# Patient Record
Sex: MALE | Race: WHITE
[De-identification: names, ages, dates, MRNs, and addresses within clinical notes are randomized per-mention and may not be internally consistent; named-entity substitution may affect disease eponyms.]

---

## 2018-04-02 PROBLEM — Z00.00 ENCOUNTER FOR PREVENTIVE HEALTH EXAMINATION: Status: ACTIVE | Noted: 2018-04-02

## 2018-04-03 ENCOUNTER — APPOINTMENT (OUTPATIENT)
Dept: NEUROLOGY | Facility: CLINIC | Age: 73
End: 2018-04-03
Payer: MEDICARE

## 2018-04-03 VITALS
BODY MASS INDEX: 26.18 KG/M2 | DIASTOLIC BLOOD PRESSURE: 85 MMHG | HEIGHT: 71 IN | SYSTOLIC BLOOD PRESSURE: 167 MMHG | HEART RATE: 64 BPM | WEIGHT: 187 LBS

## 2018-04-03 DIAGNOSIS — K21.9 GASTRO-ESOPHAGEAL REFLUX DISEASE W/OUT ESOPHAGITIS: ICD-10-CM

## 2018-04-03 DIAGNOSIS — Z82.49 FAMILY HISTORY OF ISCHEMIC HEART DISEASE AND OTHER DISEASES OF THE CIRCULATORY SYSTEM: ICD-10-CM

## 2018-04-03 DIAGNOSIS — E11.9 TYPE 2 DIABETES MELLITUS W/OUT COMPLICATIONS: ICD-10-CM

## 2018-04-03 DIAGNOSIS — H91.93 UNSPECIFIED HEARING LOSS, BILATERAL: ICD-10-CM

## 2018-04-03 DIAGNOSIS — Z87.891 PERSONAL HISTORY OF NICOTINE DEPENDENCE: ICD-10-CM

## 2018-04-03 DIAGNOSIS — Z79.4 TYPE 2 DIABETES MELLITUS W/OUT COMPLICATIONS: ICD-10-CM

## 2018-04-03 DIAGNOSIS — Z83.3 FAMILY HISTORY OF DIABETES MELLITUS: ICD-10-CM

## 2018-04-03 DIAGNOSIS — K51.90 ULCERATIVE COLITIS, UNSPECIFIED, W/OUT COMPLICATIONS: ICD-10-CM

## 2018-04-03 DIAGNOSIS — Z85.01 PERSONAL HISTORY OF MALIGNANT NEOPLASM OF ESOPHAGUS: ICD-10-CM

## 2018-04-03 PROCEDURE — 96116 NUBHVL XM PHYS/QHP 1ST HR: CPT | Mod: 59

## 2018-04-03 PROCEDURE — 99205 OFFICE O/P NEW HI 60 MIN: CPT | Mod: 25

## 2018-04-03 RX ORDER — AZELASTINE HYDROCHLORIDE 137 UG/1
137 SPRAY, METERED NASAL
Qty: 30 | Refills: 0 | Status: ACTIVE | COMMUNITY
Start: 2017-08-31

## 2018-04-03 RX ORDER — INSULIN LISPRO 100 [IU]/ML
100 INJECTION, SOLUTION INTRAVENOUS; SUBCUTANEOUS
Qty: 15 | Refills: 0 | Status: ACTIVE | COMMUNITY
Start: 2017-08-04

## 2018-04-03 RX ORDER — PANTOPRAZOLE 40 MG/1
40 TABLET, DELAYED RELEASE ORAL
Qty: 60 | Refills: 0 | Status: ACTIVE | COMMUNITY
Start: 2017-09-11

## 2018-04-03 RX ORDER — PEN NEEDLE, DIABETIC 32 GX 1/4"
32G X 6 MM NEEDLE, DISPOSABLE MISCELLANEOUS
Qty: 200 | Refills: 0 | Status: ACTIVE | COMMUNITY
Start: 2018-02-16

## 2018-04-03 RX ORDER — RANITIDINE 300 MG/1
300 TABLET ORAL
Qty: 90 | Refills: 0 | Status: ACTIVE | COMMUNITY
Start: 2017-11-14

## 2018-04-03 RX ORDER — INSULIN DETEMIR 100 [IU]/ML
100 INJECTION, SOLUTION SUBCUTANEOUS
Qty: 27 | Refills: 0 | Status: ACTIVE | COMMUNITY
Start: 2017-08-04

## 2018-04-03 RX ORDER — SITAGLIPTIN AND METFORMIN HYDROCHLORIDE 100; 1000 MG/1; MG/1
100-1000 TABLET, FILM COATED, EXTENDED RELEASE ORAL
Qty: 30 | Refills: 0 | Status: ACTIVE | COMMUNITY
Start: 2017-05-05

## 2018-04-11 ENCOUNTER — APPOINTMENT (OUTPATIENT)
Dept: MRI IMAGING | Facility: CLINIC | Age: 73
End: 2018-04-11
Payer: MEDICARE

## 2018-04-11 ENCOUNTER — OUTPATIENT (OUTPATIENT)
Dept: OUTPATIENT SERVICES | Facility: HOSPITAL | Age: 73
LOS: 1 days | End: 2018-04-11
Payer: MEDICARE

## 2018-04-11 DIAGNOSIS — Z00.8 ENCOUNTER FOR OTHER GENERAL EXAMINATION: ICD-10-CM

## 2018-04-11 PROCEDURE — 82565 ASSAY OF CREATININE: CPT

## 2018-04-11 PROCEDURE — 70547 MR ANGIOGRAPHY NECK W/O DYE: CPT | Mod: 26

## 2018-04-11 PROCEDURE — 70544 MR ANGIOGRAPHY HEAD W/O DYE: CPT

## 2018-04-11 PROCEDURE — 70553 MRI BRAIN STEM W/O & W/DYE: CPT

## 2018-04-11 PROCEDURE — A9585: CPT

## 2018-04-11 PROCEDURE — 70553 MRI BRAIN STEM W/O & W/DYE: CPT | Mod: 26

## 2018-04-11 PROCEDURE — 70547 MR ANGIOGRAPHY NECK W/O DYE: CPT

## 2018-04-30 ENCOUNTER — APPOINTMENT (OUTPATIENT)
Dept: NEUROLOGY | Facility: CLINIC | Age: 73
End: 2018-04-30
Payer: MEDICARE

## 2018-04-30 PROCEDURE — 95819 EEG AWAKE AND ASLEEP: CPT

## 2018-05-16 ENCOUNTER — APPOINTMENT (OUTPATIENT)
Dept: NEUROLOGY | Facility: CLINIC | Age: 73
End: 2018-05-16
Payer: MEDICARE

## 2018-05-16 VITALS
HEIGHT: 71 IN | HEART RATE: 62 BPM | SYSTOLIC BLOOD PRESSURE: 163 MMHG | BODY MASS INDEX: 26.88 KG/M2 | WEIGHT: 192 LBS | DIASTOLIC BLOOD PRESSURE: 78 MMHG

## 2018-05-16 DIAGNOSIS — R03.0 ELEVATED BLOOD-PRESSURE READING, W/OUT DIAGNOSIS OF HYPERTENSION: ICD-10-CM

## 2018-05-16 DIAGNOSIS — E53.8 DEFICIENCY OF OTHER SPECIFIED B GROUP VITAMINS: ICD-10-CM

## 2018-05-16 DIAGNOSIS — E55.9 VITAMIN D DEFICIENCY, UNSPECIFIED: ICD-10-CM

## 2018-05-16 DIAGNOSIS — R41.89 OTHER SYMPTOMS AND SIGNS INVOLVING COGNITIVE FUNCTIONS AND AWARENESS: ICD-10-CM

## 2018-05-16 PROCEDURE — 99215 OFFICE O/P EST HI 40 MIN: CPT

## 2018-05-16 RX ORDER — ROSUVASTATIN CALCIUM 5 MG/1
5 TABLET, FILM COATED ORAL DAILY
Refills: 0 | Status: ACTIVE | COMMUNITY
Start: 2018-01-30

## 2018-05-16 RX ORDER — ASPIRIN 81 MG
81 TABLET, DELAYED RELEASE (ENTERIC COATED) ORAL
Refills: 0 | Status: ACTIVE | COMMUNITY

## 2018-05-16 RX ORDER — MESALAMINE 1.2 G/1
1.2 TABLET, DELAYED RELEASE ORAL
Qty: 120 | Refills: 0 | Status: ACTIVE | COMMUNITY
Start: 2017-06-20

## 2018-05-16 RX ORDER — CROMOLYN SODIUM 5.2 MG
AEROSOL, SPRAY WITH PUMP (ML) NASAL
Refills: 0 | Status: ACTIVE | COMMUNITY

## 2018-06-11 ENCOUNTER — APPOINTMENT (OUTPATIENT)
Dept: NEUROLOGY | Facility: CLINIC | Age: 73
End: 2018-06-11
Payer: MEDICARE

## 2018-06-11 VITALS
HEART RATE: 69 BPM | WEIGHT: 195 LBS | HEIGHT: 71 IN | DIASTOLIC BLOOD PRESSURE: 68 MMHG | SYSTOLIC BLOOD PRESSURE: 151 MMHG | BODY MASS INDEX: 27.3 KG/M2

## 2018-06-11 DIAGNOSIS — I67.9 CEREBROVASCULAR DISEASE, UNSPECIFIED: ICD-10-CM

## 2018-06-11 DIAGNOSIS — E78.5 HYPERLIPIDEMIA, UNSPECIFIED: ICD-10-CM

## 2018-06-11 PROCEDURE — 99215 OFFICE O/P EST HI 40 MIN: CPT

## 2018-07-31 ENCOUNTER — APPOINTMENT (OUTPATIENT)
Dept: NEUROLOGY | Facility: CLINIC | Age: 73
End: 2018-07-31

## 2018-09-27 ENCOUNTER — APPOINTMENT (OUTPATIENT)
Dept: NEUROLOGY | Facility: CLINIC | Age: 73
End: 2018-09-27

## 2024-11-28 ENCOUNTER — INPATIENT (INPATIENT)
Facility: HOSPITAL | Age: 79
LOS: 5 days | Discharge: NURSING, REHAB OR LONG-TERM CARE | DRG: 871 | End: 2024-12-04
Attending: STUDENT IN AN ORGANIZED HEALTH CARE EDUCATION/TRAINING PROGRAM | Admitting: INTERNAL MEDICINE
Payer: MEDICARE

## 2024-11-28 VITALS
HEART RATE: 163 BPM | DIASTOLIC BLOOD PRESSURE: 81 MMHG | OXYGEN SATURATION: 100 % | SYSTOLIC BLOOD PRESSURE: 107 MMHG | TEMPERATURE: 98 F | RESPIRATION RATE: 16 BRPM

## 2024-11-28 DIAGNOSIS — I48.91 UNSPECIFIED ATRIAL FIBRILLATION: ICD-10-CM

## 2024-11-28 DIAGNOSIS — F32.89 OTHER SPECIFIED DEPRESSIVE EPISODES: ICD-10-CM

## 2024-11-28 DIAGNOSIS — I10 ESSENTIAL (PRIMARY) HYPERTENSION: ICD-10-CM

## 2024-11-28 DIAGNOSIS — Z87.81 PERSONAL HISTORY OF (HEALED) TRAUMATIC FRACTURE: Chronic | ICD-10-CM

## 2024-11-28 DIAGNOSIS — E11.9 TYPE 2 DIABETES MELLITUS WITHOUT COMPLICATIONS: ICD-10-CM

## 2024-11-28 DIAGNOSIS — K86.9 DISEASE OF PANCREAS, UNSPECIFIED: ICD-10-CM

## 2024-11-28 DIAGNOSIS — N17.9 ACUTE KIDNEY FAILURE, UNSPECIFIED: ICD-10-CM

## 2024-11-28 DIAGNOSIS — E87.29 OTHER ACIDOSIS: ICD-10-CM

## 2024-11-28 DIAGNOSIS — K57.10 DIVERTICULOSIS OF SMALL INTESTINE WITHOUT PERFORATION OR ABSCESS WITHOUT BLEEDING: ICD-10-CM

## 2024-11-28 DIAGNOSIS — D64.9 ANEMIA, UNSPECIFIED: ICD-10-CM

## 2024-11-28 DIAGNOSIS — K44.9 DIAPHRAGMATIC HERNIA WITHOUT OBSTRUCTION OR GANGRENE: ICD-10-CM

## 2024-11-28 DIAGNOSIS — Z29.9 ENCOUNTER FOR PROPHYLACTIC MEASURES, UNSPECIFIED: ICD-10-CM

## 2024-11-28 DIAGNOSIS — N17.0 ACUTE KIDNEY FAILURE WITH TUBULAR NECROSIS: ICD-10-CM

## 2024-11-28 DIAGNOSIS — G93.41 METABOLIC ENCEPHALOPATHY: ICD-10-CM

## 2024-11-28 DIAGNOSIS — A41.9 SEPSIS, UNSPECIFIED ORGANISM: ICD-10-CM

## 2024-11-28 DIAGNOSIS — K50.90 CROHN'S DISEASE, UNSPECIFIED, WITHOUT COMPLICATIONS: ICD-10-CM

## 2024-11-28 LAB
ADD ON TEST-SPECIMEN IN LAB: SIGNIFICANT CHANGE UP
ALBUMIN SERPL ELPH-MCNC: 3.5 G/DL — SIGNIFICANT CHANGE UP (ref 3.3–5)
ALP SERPL-CCNC: 176 U/L — HIGH (ref 40–120)
ALT FLD-CCNC: <5 U/L — LOW (ref 10–45)
ANION GAP SERPL CALC-SCNC: 17 MMOL/L — SIGNIFICANT CHANGE UP (ref 5–17)
APPEARANCE UR: CLEAR — SIGNIFICANT CHANGE UP
APTT BLD: 29.6 SEC — SIGNIFICANT CHANGE UP (ref 24.5–35.6)
AST SERPL-CCNC: 9 U/L — LOW (ref 10–40)
BASOPHILS # BLD AUTO: 0.02 K/UL — SIGNIFICANT CHANGE UP (ref 0–0.2)
BASOPHILS NFR BLD AUTO: 0.3 % — SIGNIFICANT CHANGE UP (ref 0–2)
BILIRUB SERPL-MCNC: 0.3 MG/DL — SIGNIFICANT CHANGE UP (ref 0.2–1.2)
BILIRUB UR-MCNC: NEGATIVE — SIGNIFICANT CHANGE UP
BUN SERPL-MCNC: 56 MG/DL — HIGH (ref 7–23)
CALCIUM SERPL-MCNC: 9.2 MG/DL — SIGNIFICANT CHANGE UP (ref 8.4–10.5)
CHLORIDE SERPL-SCNC: 102 MMOL/L — SIGNIFICANT CHANGE UP (ref 96–108)
CO2 SERPL-SCNC: 20 MMOL/L — LOW (ref 22–31)
COLOR SPEC: SIGNIFICANT CHANGE UP
CREAT SERPL-MCNC: 0.99 MG/DL — SIGNIFICANT CHANGE UP (ref 0.5–1.3)
DIFF PNL FLD: NEGATIVE — SIGNIFICANT CHANGE UP
EGFR: 78 ML/MIN/1.73M2 — SIGNIFICANT CHANGE UP
EOSINOPHIL # BLD AUTO: 0.06 K/UL — SIGNIFICANT CHANGE UP (ref 0–0.5)
EOSINOPHIL NFR BLD AUTO: 0.8 % — SIGNIFICANT CHANGE UP (ref 0–6)
FLUAV AG NPH QL: SIGNIFICANT CHANGE UP
FLUBV AG NPH QL: SIGNIFICANT CHANGE UP
GAS PNL BLDV: SIGNIFICANT CHANGE UP
GLUCOSE SERPL-MCNC: 247 MG/DL — HIGH (ref 70–99)
GLUCOSE UR QL: 500 MG/DL
HCT VFR BLD CALC: 33 % — LOW (ref 39–50)
HGB BLD-MCNC: 10.3 G/DL — LOW (ref 13–17)
IMM GRANULOCYTES NFR BLD AUTO: 0.5 % — SIGNIFICANT CHANGE UP (ref 0–0.9)
INR BLD: 1.15 — SIGNIFICANT CHANGE UP (ref 0.85–1.16)
KETONES UR-MCNC: ABNORMAL MG/DL
LACTATE SERPL-SCNC: 1.3 MMOL/L — SIGNIFICANT CHANGE UP (ref 0.5–2)
LACTATE SERPL-SCNC: 2.2 MMOL/L — HIGH (ref 0.5–2)
LEUKOCYTE ESTERASE UR-ACNC: NEGATIVE — SIGNIFICANT CHANGE UP
LYMPHOCYTES # BLD AUTO: 0.62 K/UL — LOW (ref 1–3.3)
LYMPHOCYTES # BLD AUTO: 8.4 % — LOW (ref 13–44)
MCHC RBC-ENTMCNC: 28.5 PG — SIGNIFICANT CHANGE UP (ref 27–34)
MCHC RBC-ENTMCNC: 31.2 G/DL — LOW (ref 32–36)
MCV RBC AUTO: 91.2 FL — SIGNIFICANT CHANGE UP (ref 80–100)
MONOCYTES # BLD AUTO: 0.25 K/UL — SIGNIFICANT CHANGE UP (ref 0–0.9)
MONOCYTES NFR BLD AUTO: 3.4 % — SIGNIFICANT CHANGE UP (ref 2–14)
NEUTROPHILS # BLD AUTO: 6.43 K/UL — SIGNIFICANT CHANGE UP (ref 1.8–7.4)
NEUTROPHILS NFR BLD AUTO: 86.6 % — HIGH (ref 43–77)
NITRITE UR-MCNC: NEGATIVE — SIGNIFICANT CHANGE UP
NRBC # BLD: 0 /100 WBCS — SIGNIFICANT CHANGE UP (ref 0–0)
PH UR: 5 — SIGNIFICANT CHANGE UP (ref 5–8)
PLATELET # BLD AUTO: 288 K/UL — SIGNIFICANT CHANGE UP (ref 150–400)
POTASSIUM SERPL-MCNC: 4.7 MMOL/L — SIGNIFICANT CHANGE UP (ref 3.5–5.3)
POTASSIUM SERPL-SCNC: 4.7 MMOL/L — SIGNIFICANT CHANGE UP (ref 3.5–5.3)
PROT SERPL-MCNC: 7.5 G/DL — SIGNIFICANT CHANGE UP (ref 6–8.3)
PROT UR-MCNC: 30 MG/DL
PROTHROM AB SERPL-ACNC: 13.4 SEC — SIGNIFICANT CHANGE UP (ref 9.9–13.4)
RBC # BLD: 3.62 M/UL — LOW (ref 4.2–5.8)
RBC # FLD: 14.2 % — SIGNIFICANT CHANGE UP (ref 10.3–14.5)
RSV RNA NPH QL NAA+NON-PROBE: SIGNIFICANT CHANGE UP
SARS-COV-2 RNA SPEC QL NAA+PROBE: SIGNIFICANT CHANGE UP
SODIUM SERPL-SCNC: 139 MMOL/L — SIGNIFICANT CHANGE UP (ref 135–145)
SP GR SPEC: 1.02 — SIGNIFICANT CHANGE UP (ref 1–1.03)
TROPONIN T, HIGH SENSITIVITY RESULT: 103 NG/L — CRITICAL HIGH (ref 0–51)
TROPONIN T, HIGH SENSITIVITY RESULT: 91 NG/L — CRITICAL HIGH (ref 0–51)
UROBILINOGEN FLD QL: 1 MG/DL — SIGNIFICANT CHANGE UP (ref 0.2–1)
WBC # BLD: 7.42 K/UL — SIGNIFICANT CHANGE UP (ref 3.8–10.5)
WBC # FLD AUTO: 7.42 K/UL — SIGNIFICANT CHANGE UP (ref 3.8–10.5)

## 2024-11-28 PROCEDURE — 99223 1ST HOSP IP/OBS HIGH 75: CPT

## 2024-11-28 PROCEDURE — 70450 CT HEAD/BRAIN W/O DYE: CPT | Mod: 26,MC

## 2024-11-28 PROCEDURE — 74177 CT ABD & PELVIS W/CONTRAST: CPT | Mod: 26,MC

## 2024-11-28 PROCEDURE — 93010 ELECTROCARDIOGRAM REPORT: CPT

## 2024-11-28 PROCEDURE — 71045 X-RAY EXAM CHEST 1 VIEW: CPT | Mod: 26

## 2024-11-28 PROCEDURE — 99285 EMERGENCY DEPT VISIT HI MDM: CPT

## 2024-11-28 RX ORDER — ACETAMINOPHEN, DIPHENHYDRAMINE HCL, PHENYLEPHRINE HCL 325; 25; 5 MG/1; MG/1; MG/1
3 TABLET ORAL AT BEDTIME
Refills: 0 | Status: DISCONTINUED | OUTPATIENT
Start: 2024-11-28 | End: 2024-12-04

## 2024-11-28 RX ORDER — PANTOPRAZOLE SODIUM 40 MG/1
40 TABLET, DELAYED RELEASE ORAL
Refills: 0 | Status: DISCONTINUED | OUTPATIENT
Start: 2024-11-28 | End: 2024-12-04

## 2024-11-28 RX ORDER — SODIUM CHLORIDE 9 MG/ML
1000 INJECTION, SOLUTION INTRAMUSCULAR; INTRAVENOUS; SUBCUTANEOUS ONCE
Refills: 0 | Status: COMPLETED | OUTPATIENT
Start: 2024-11-28 | End: 2024-11-28

## 2024-11-28 RX ORDER — ACETAMINOPHEN 500MG 500 MG/1
1000 TABLET, COATED ORAL ONCE
Refills: 0 | Status: COMPLETED | OUTPATIENT
Start: 2024-11-28 | End: 2024-11-28

## 2024-11-28 RX ORDER — METOPROLOL TARTRATE 100 MG/1
5 TABLET, FILM COATED ORAL ONCE
Refills: 0 | Status: DISCONTINUED | OUTPATIENT
Start: 2024-11-28 | End: 2024-11-28

## 2024-11-28 RX ORDER — VANCOMYCIN HCL 900 MCG/MG
1250 POWDER (GRAM) MISCELLANEOUS EVERY 12 HOURS
Refills: 0 | Status: DISCONTINUED | OUTPATIENT
Start: 2024-11-29 | End: 2024-11-29

## 2024-11-28 RX ORDER — GLUCAGON INJECTION, SOLUTION 0.5 MG/.1ML
1 INJECTION, SOLUTION SUBCUTANEOUS ONCE
Refills: 0 | Status: DISCONTINUED | OUTPATIENT
Start: 2024-11-28 | End: 2024-12-04

## 2024-11-28 RX ORDER — MIRTAZAPINE 15 MG/1
15 TABLET, FILM COATED ORAL AT BEDTIME
Refills: 0 | Status: DISCONTINUED | OUTPATIENT
Start: 2024-11-28 | End: 2024-11-28

## 2024-11-28 RX ORDER — CHOLECALCIFEROL (VITAMIN D3) 10MCG/0.25
1000 DROPS ORAL DAILY
Refills: 0 | Status: DISCONTINUED | OUTPATIENT
Start: 2024-11-28 | End: 2024-12-04

## 2024-11-28 RX ORDER — ONDANSETRON HYDROCHLORIDE 4 MG/1
4 TABLET, FILM COATED ORAL EVERY 8 HOURS
Refills: 0 | Status: DISCONTINUED | OUTPATIENT
Start: 2024-11-28 | End: 2024-12-04

## 2024-11-28 RX ORDER — PIPERACILLIN SODIUM AND TAZOBACTAM SODIUM 4; .5 G/20ML; G/20ML
3.38 INJECTION, POWDER, LYOPHILIZED, FOR SOLUTION INTRAVENOUS ONCE
Refills: 0 | Status: COMPLETED | OUTPATIENT
Start: 2024-11-28 | End: 2024-11-28

## 2024-11-28 RX ORDER — RIVAROXABAN 10 MG/1
20 TABLET, FILM COATED ORAL
Refills: 0 | Status: DISCONTINUED | OUTPATIENT
Start: 2024-11-28 | End: 2024-12-04

## 2024-11-28 RX ORDER — PIPERACILLIN SODIUM AND TAZOBACTAM SODIUM 4; .5 G/20ML; G/20ML
4.5 INJECTION, POWDER, LYOPHILIZED, FOR SOLUTION INTRAVENOUS EVERY 8 HOURS
Refills: 0 | Status: DISCONTINUED | OUTPATIENT
Start: 2024-11-28 | End: 2024-11-29

## 2024-11-28 RX ORDER — VANCOMYCIN HCL 900 MCG/MG
1000 POWDER (GRAM) MISCELLANEOUS ONCE
Refills: 0 | Status: COMPLETED | OUTPATIENT
Start: 2024-11-28 | End: 2024-11-28

## 2024-11-28 RX ORDER — ACETAMINOPHEN 500MG 500 MG/1
650 TABLET, COATED ORAL EVERY 6 HOURS
Refills: 0 | Status: DISCONTINUED | OUTPATIENT
Start: 2024-11-28 | End: 2024-12-04

## 2024-11-28 RX ORDER — 0.9 % SODIUM CHLORIDE 0.9 %
1000 INTRAVENOUS SOLUTION INTRAVENOUS
Refills: 0 | Status: DISCONTINUED | OUTPATIENT
Start: 2024-11-28 | End: 2024-11-29

## 2024-11-28 RX ORDER — MAGNESIUM, ALUMINUM HYDROXIDE 200-225/5
30 SUSPENSION, ORAL (FINAL DOSE FORM) ORAL EVERY 4 HOURS
Refills: 0 | Status: DISCONTINUED | OUTPATIENT
Start: 2024-11-28 | End: 2024-12-04

## 2024-11-28 RX ORDER — METOPROLOL TARTRATE 100 MG/1
25 TABLET, FILM COATED ORAL EVERY 24 HOURS
Refills: 0 | Status: DISCONTINUED | OUTPATIENT
Start: 2024-11-28 | End: 2024-12-04

## 2024-11-28 RX ORDER — 0.9 % SODIUM CHLORIDE 0.9 %
1000 INTRAVENOUS SOLUTION INTRAVENOUS
Refills: 0 | Status: DISCONTINUED | OUTPATIENT
Start: 2024-11-28 | End: 2024-12-04

## 2024-11-28 RX ADMIN — Medication 75 MILLILITER(S): at 21:39

## 2024-11-28 RX ADMIN — SODIUM CHLORIDE 1000 MILLILITER(S): 9 INJECTION, SOLUTION INTRAMUSCULAR; INTRAVENOUS; SUBCUTANEOUS at 12:39

## 2024-11-28 RX ADMIN — Medication 250 MILLIGRAM(S): at 13:11

## 2024-11-28 RX ADMIN — Medication 1000 MILLIGRAM(S): at 14:29

## 2024-11-28 RX ADMIN — PIPERACILLIN SODIUM AND TAZOBACTAM SODIUM 3.38 GRAM(S): 4; .5 INJECTION, POWDER, LYOPHILIZED, FOR SOLUTION INTRAVENOUS at 13:30

## 2024-11-28 RX ADMIN — METOPROLOL TARTRATE 25 MILLIGRAM(S): 100 TABLET, FILM COATED ORAL at 19:52

## 2024-11-28 RX ADMIN — SODIUM CHLORIDE 1000 MILLILITER(S): 9 INJECTION, SOLUTION INTRAMUSCULAR; INTRAVENOUS; SUBCUTANEOUS at 14:29

## 2024-11-28 RX ADMIN — RIVAROXABAN 20 MILLIGRAM(S): 10 TABLET, FILM COATED ORAL at 22:42

## 2024-11-28 RX ADMIN — PIPERACILLIN SODIUM AND TAZOBACTAM SODIUM 200 GRAM(S): 4; .5 INJECTION, POWDER, LYOPHILIZED, FOR SOLUTION INTRAVENOUS at 12:42

## 2024-11-28 RX ADMIN — ACETAMINOPHEN 500MG 1000 MILLIGRAM(S): 500 TABLET, COATED ORAL at 13:30

## 2024-11-28 RX ADMIN — SODIUM CHLORIDE 1000 MILLILITER(S): 9 INJECTION, SOLUTION INTRAMUSCULAR; INTRAVENOUS; SUBCUTANEOUS at 12:58

## 2024-11-28 RX ADMIN — PIPERACILLIN SODIUM AND TAZOBACTAM SODIUM 25 GRAM(S): 4; .5 INJECTION, POWDER, LYOPHILIZED, FOR SOLUTION INTRAVENOUS at 22:21

## 2024-11-28 RX ADMIN — SODIUM CHLORIDE 1000 MILLILITER(S): 9 INJECTION, SOLUTION INTRAMUSCULAR; INTRAVENOUS; SUBCUTANEOUS at 13:30

## 2024-11-28 RX ADMIN — ACETAMINOPHEN 500MG 400 MILLIGRAM(S): 500 TABLET, COATED ORAL at 12:40

## 2024-11-28 RX ADMIN — Medication 1000 UNIT(S): at 19:52

## 2024-11-28 NOTE — H&P ADULT - NSHPPHYSICALEXAM_GEN_ALL_CORE
.  VITAL SIGNS:  T(C): 36.3 (11-28-24 @ 16:02), Max: 38 (11-28-24 @ 11:45)  T(F): 97.4 (11-28-24 @ 16:02), Max: 100.4 (11-28-24 @ 11:45)  HR: 84 (11-28-24 @ 16:02) (76 - 166)  BP: 126/64 (11-28-24 @ 16:02) (91/65 - 126/64)  BP(mean): --  RR: 16 (11-28-24 @ 16:02) (16 - 20)  SpO2: 100% (11-28-24 @ 16:02) (95% - 100%)  Wt(kg): --    PHYSICAL EXAM:    Constitutional: Resting comfortably in bed; NAD  Head: NC/AT  Eyes: PERRL, EOMI, clear conjunctiva  ENT: no nasal discharge; uvula midline, no oropharyngeal erythema or exudates; MMM  Neck: supple; no JVD or thyromegaly  Respiratory: CTA B/L; no W/R/R, no retractions  Cardiac: +S1/S2; RRR; no M/R/G;  Gastrointestinal: soft, NT/ND; no rebound or guarding; +BSx4  Extremities: WWP, no clubbing or cyanosis; no peripheral edema  Musculoskeletal: NROM x4; no joint swelling, tenderness or erythema  Vascular: 2+ radial, femoral, DP/PT pulses B/L  Dermatologic: skin warm, dry and intact; no rashes, wounds, or scars  Neurologic: AAOx3; CNII-XII grossly intact; no focal deficits  Psychiatric: affect and characteristics of appearance, verbalizations, behaviors are appropriate .  VITAL SIGNS:  T(C): 36.3 (11-28-24 @ 16:02), Max: 38 (11-28-24 @ 11:45)  T(F): 97.4 (11-28-24 @ 16:02), Max: 100.4 (11-28-24 @ 11:45)  HR: 84 (11-28-24 @ 16:02) (76 - 166)  BP: 126/64 (11-28-24 @ 16:02) (91/65 - 126/64)  BP(mean): --  RR: 16 (11-28-24 @ 16:02) (16 - 20)  SpO2: 100% (11-28-24 @ 16:02) (95% - 100%)  Wt(kg): --    PHYSICAL EXAM:    Constitutional: Resting comfortably in bed; NAD, lethargic but opens eyes to sternal rub, able to cough, move upper extremities   Head: NC/AT  Eyes: PERRL, clear conjunctiva  ENT: no nasal discharge;  MM are dry  Neck: supple;  Respiratory: Decreased breath sound in L base; normal respiratory effort   Cardiac: +S1/S2; Irregularly irregular, systolic murmur louder in R parasternal border   Gastrointestinal: soft, diffusely tender to palpation   Extremities: WWP, no clubbing or cyanosis; no peripheral edema  Musculoskeletal: no joint swelling, tenderness or erythema  Dermatologic: Sacral ulcer with necrotic tissue, purulent discharge and surrounding erythema, Otherwise skin warm, dry and intact; no rashes, wounds, or scars  Neurologic: Lethargic

## 2024-11-28 NOTE — H&P ADULT - PROBLEM SELECTOR PLAN 9
Home meds: Losartan 50 qd    Plan:   -hold as normotensive, restart if needed Bicarb 20, AG 17 likely i/s/o lactic acidosis     Plan:   monitor BMP

## 2024-11-28 NOTE — H&P ADULT - PROBLEM SELECTOR PLAN 10
Home meds: Alogliptin 25 qd, Metformin 1000 BID, Insulin Glargine 5U qhs    Plan:   -check a1c  -iss Home meds: Losartan 50 qd    Plan:   -hold as normotensive, restart if needed

## 2024-11-28 NOTE — H&P ADULT - ATTENDING COMMENTS
80 yo M ([facility]) with PMHx AFib (Xarelto), esophageal cancer (s/p partial esophagectomy), ulcerative colitis, DM (c/b peripheral neuropathy), HTN, HLD, asthma, depression/anxiety BIBEMS from NH with 1-2d hx of altered mental status and increased lethargy, found to have imaging concerning for LLL lung consolidation, admitted for further evaluation and management of metabolic encephalopathy 2/2 community-acquired PNA vs infected sacral ulcer.     Meeting 2/4 SIRS criteria (HR >90, T 100.4F). Initial VS concerning for -160s. EKG reviewed. HR improved s/p 2L IVF. Satting well on RA at rest. Troponin T 103, repeat 91. Likely demand i/s/o rapid AFlutter. Remainder of labs reviewed. No significant leukocytosis or bandemia. Lactate 2.2, repeat 1.3. Pro-madai 0.22. Hb 10.3 (MCV 91.2). No prior labs to compare. Bicarb 20. BUN 56. AlkPhos 176. Remainder of CBC/CMP largely within normal limits. UA with <5 WBC and negative bacteria however +hyaline/granular casts. COVID/Flu/RSV PCR negative. CTH without acute pathology. CXR clear without consolidations or effusions however CT reading findings concerning for LLL PNA. Additional findings include pancreatic head abnormality, duodenal diverticulum vs contained perf, moderate amount steatorrhea, and sacral decubitus ulcer without evidence of OM. Surgery consulted in ED given findings of possible contained perforation, no acute surgical interventions recommended at this time.      [ ] Repeat EKG   [ ] TTE   [ ] Continue home Toprol/Xarelto (AFib)   [ ] Hold home Losartan      [ ] Vanc + Zosyn   [ ] MRSA PCR, Urine Legionella/Strep, Sputum cx   [ ] Blood cx x2 (Sent from ED)   [ ] Full RVP     [ ] Surgery consulted in ED (Duodenal diverticulum vs contained perforation)   - No acute surgical interventions   [ ] Bowel regimen     [ ] Wound care consult (Sacral decubitus ulcer)   [ ] Aspiration precautions   [ ] Fall precautions   [ ] PT evaluation

## 2024-11-28 NOTE — H&P ADULT - PROBLEM SELECTOR PLAN 12
DVT ppx; DVT ppx; xarelto  Diet: NPO pend dysphagia screen   Replete: Mg<2, Ph<3, K<4  Code status: DNR/DNI  Dispo: [facility] Home med: Mirtazapine 15 qhs    Plan:   -Hold mirtazapine given concern for sedation

## 2024-11-28 NOTE — H&P ADULT - PROBLEM SELECTOR PLAN 5
#anemia   On admission:   Hgb 10.3, MCV 91.2    Plan:   -f/u irion studies. ferritin, B12, folate, reticuloctye count On admission: Hgb 10.3, MCV 91.2. No active signs of bleeding. On home iron supplements   As per the daughter had colonoscopy recently without concerning findings     Plan:   -f/u Iron studies. ferritin, B12, folate, reticulocyte count  -Hold iron supplement given sepsis CT showed: A portion of the pancreatic tail herniating into the intrathoracic  space via esophageal hiatus as well as intrathoracic stomach.   Home meds:   Omeprazole 40 qd    Plan:   -c/w pantoprazole 40 (interchange)   -surgery recs regarding plans for intervention

## 2024-11-28 NOTE — PATIENT PROFILE ADULT - FUNCTIONAL ASSESSMENT - BASIC MOBILITY 6.
1-calculated by average/Not able to assess (calculate score using [facility] averaging method)

## 2024-11-28 NOTE — H&P ADULT - PROBLEM SELECTOR PLAN 2
EKG: , QTc 398, regular narrow complex tachycardia, likely SVT  Troponin downtrended    Plan:   Repeat EKG On presentation HR 160s. EKG: , QTc 398, regular narrow complex tachycardia, likely SVT. Troponin downtrended Troponin 103->91. On admission VS stable s/p fluids HR 99, /76   Home meds: Metoprolol succinate 25 qd and Xarelto 20 qd    Plan:   -Repeat EKG  -c/w Metoprolol succinate 25 qd and Xarelto 20 qd On presentation HR 160s. EKG: , QTc 398, regular narrow complex tachycardia, likely SVT. Troponin downtrended Troponin 103->91. On admission VS stable s/p fluids HR 99, /76   Loud systolic murmur on R Sternal Border   Home meds: Metoprolol succinate 25 qd and Xarelto 20 qd    Plan:   -Repeat EKG  -c/w Metoprolol succinate 25 qd and Xarelto 20 qd  -obtain TTE Lethargic but arousable. Protecting airway. Baseline AOx4. Likely i/s/o severe sepsis 2/2 PNA vs sacral ulcer vs other.    Plan:  -Management as above

## 2024-11-28 NOTE — ED ADULT NURSE REASSESSMENT NOTE - NS ED NURSE REASSESS COMMENT FT1
Patient baseline mental status, lethargic, no c/o of pain, no SOB, not in any distress.  Cardiac monitoring ongoing, BP and HR improved, afebrile, other vitals stable.  NSS bolus ongoing, Zosyn IVPB completed, VAncomycin IVPB ongoing, no adverse rxn.  CT scan pending.  In and out straight cath done aseptically; UA UC sent to lab.  Repositioned for comfort.  Fall precaution observed.  STable and comfortable.

## 2024-11-28 NOTE — H&P ADULT - ASSESSMENT
79 M hx of afib on xarelto, htn, esophageal neoplasm, crohns, T2DM, sacral ulcer, here for lethargy/AMS and hypotension in NH noticed this morning found to have sepsis, likely i/s/o pneumonia vs sacral ulcer.

## 2024-11-28 NOTE — ED ADULT NURSE NOTE - CHPI ED NUR SYMPTOMS NEG
no chest pain/no chills/no congestion/no diaphoresis/no dizziness/no nausea/no shortness of breath/no syncope/no vomiting

## 2024-11-28 NOTE — ED ADULT NURSE REASSESSMENT NOTE - NS ED NURSE REASSESS COMMENT FT1
Patient sleepy, appears confused, not answering questions appropriately, no pain complaint, no SOB.  Afib w/ RVR on cardiac monitor, other vitals stable.  Right FA PIV #20 in place from EMS, patent, all labs, blood cultures sent, no complications.  Large pressure ulcer unstageable w/ black and green eschar noted on sacrum.  NSS 2 L bolus ongoing, Zosyn IVPB , Tylenol 1 gm IVPB ongoing, no adverse rxn.  Xray and CT scan pending.  NPO observed.  Fall precaution observed.

## 2024-11-28 NOTE — H&P ADULT - CONVERSATION DETAILS
I spoke with Ms Guerita Davenport who was identified as health care proxy and we had an extensive conversation about Mr. Rivas care and current condition.     Discussed overall goals of care including advanced directives with her. During this dicussion we reviewed the current diagnosis, treatment plan, and likely prognosis. An explanination of advanced directives and [facility] was provided.She identifies as one of the most important goals to avoid invasive measures.  After this conversation decision was made  to change status to DNR/Trial of NIV. MOLST form completed, signed, and placed in chart.

## 2024-11-28 NOTE — ED ADULT TRIAGE NOTE - CHIEF COMPLAINT QUOTE
Per EMS, pt altered since last night, lethargic, hypontensive at nursing home, tachycardic during assessment.

## 2024-11-28 NOTE — H&P ADULT - PROBLEM SELECTOR PLAN 8
#HAGMA  Bicarb 20, AG 17 likely i/s/o lactic acidosis     Plan:   monitor BMP Bicarb 20, AG 17 likely i/s/o lactic acidosis     Plan:   monitor BMP CT showed: Ill-defined hypoattenuation of the pancreatic head.     Plan:   Consider MRI abdomen as outpatient

## 2024-11-28 NOTE — H&P ADULT - PROBLEM SELECTOR PLAN 11
Home med: Mirtazapine 15 qhs    Plan:   -Hold mirtazapine given concern for sedation Home meds: Alogliptin 25 qd, Metformin 1000 BID, Insulin Glargine 5U qhs    Plan:   -check a1c  -iss

## 2024-11-28 NOTE — CONSULT NOTE ADULT - ASSESSMENT
78yo Male pt with PMH of Afib (on Xarelto), HTN, T2DM, Chron's Disease, dementia, depression, esophageal carcinoma (remission since early 2000s), significant hearing loss, and femur fracture (10/2024); and PSH of distal esopahgectomy?. Patient was brought to ED from [facility] for lethargy, altered mental status, tachycardic and hypotension during the morning. General surgery consulted for imaging findings of pelvic collection related to sacral decub ulcer and possible contained duodenal diverticulum perf.  On exam, abdomen soft, NT, ND, no guarding, no rigidity with upper midline incision, well healed. Sacrum with sacral decubitus ulcer 5x6cm with minimal necrotic tissue, inferior eschar, and surrounding erythema; with no palpable fluctuance. CTAP showing In the medial aspect of the second portion of the duodenum, there are few air pockets, which may represent air within the duodenal diverticulum or contained perforation. Skin thickening and subcutaneous fat stranding/small collection in the sacrum, consistent with sacral decubitus ulcer. Patient admitted to medicine service for sepsis, and found to have Left Lower Lobe pneumonia and UTI. Surgery consulted for possible duodenal diverticulum contained perforation and sacral decubitus ulcer with possible collection, but given benign abdominal exam, no WBC, very low suspicion for contained duodenal perforation and most likely air within diverticulum. And  Sacral decubitus ulcer without underlying fluctuance to suggest abscess. Sepsis etiology more likely from pneumonia or UTI.     Recommendations:    No acute surgical intervention at this time  Agree with IV ATBx  Wound care consult for local wound care  Frequen turning  Wet to dry dressing changes      Team 4c will follow

## 2024-11-28 NOTE — PATIENT PROFILE ADULT - FALL HARM RISK - HARM RISK INTERVENTIONS

## 2024-11-28 NOTE — H&P ADULT - PROBLEM SELECTOR PLAN 6
On admission: BUN 56, creatinine 0.99 with granular cast, theresa i/s/o sepsis     Plan:   Moniotr BMP On admission: BUN 56, creatinine 0.99 with granular cast, theresa i/s/o sepsis     Plan:   -Monitor BMP s/p fluids  -Strict I/O On admission: Hgb 10.3, MCV 91.2. No active signs of bleeding. On home iron supplements   As per the daughter had colonoscopy recently without concerning findings     Plan:   -f/u Iron studies. ferritin, B12, folate, reticulocyte count  -Hold iron supplement given sepsis

## 2024-11-28 NOTE — H&P ADULT - PROBLEM SELECTOR PLAN 7
Ill-defined hypoattenuation of the pancreatic head.     Plan:   Consider MRI abdomen CT showed: Ill-defined hypoattenuation of the pancreatic head.     Plan:   Consider MRI abdomen as outpatient On admission: BUN 56, creatinine 0.99 with granular cast, theresa i/s/o sepsis     Plan:   -Monitor BMP s/p fluids  -Strict I/O

## 2024-11-28 NOTE — H&P ADULT - PROBLEM SELECTOR PLAN 4
A portion of the pancreatic tail herniating into the intrathoracic   space via esophageal hiatus.  Ill-defined hypoattenuation of the pancreatic head. CT showed: A portion of the pancreatic tail herniating into the intrathoracic  space via esophageal hiatus as well as intrathoracic stomach.   Home meds:   Omeprazole 40 qd    Plan:   -c/w pantoprazole 40 (interchange)   -surgery recs regarding plans for intervention On CT found to have: In the medial aspect of the second portion of the duodenum, there are few air pockets, which may represent air within the duodenal diverticulum or contained perforation  Abdomen with mild diffuse tenderness on exam  As per ED provider Surgery was consulted and recommended no surgical intervention     Plan:   -f/u final surgery recs

## 2024-11-28 NOTE — ED PROVIDER NOTE - OBJECTIVE STATEMENT
79 M hx of afib on xarelto, htn, esophageal neoplasm, crohns, T2DM, sacral ulcer, full code here for lethargy/AMS and hypotension in NH noticed this morning. Was fine yesterday evening.     Daughter emergency contact Guerita Davenport [phone] 79 M hx of afib on xarelto, htn, esophageal neoplasm, crohns, T2DM, sacral ulcer, full code here for lethargy/AMS and hypotension in NH noticed this morning. Was fine yesterday evening. Lethargic, hypotensive this morning, tachy 160s. Pt rousable but not answering questions. BP improved after 200 cc IVF en route.  A&O x 4 at baseline

## 2024-11-28 NOTE — H&P ADULT - HISTORY OF PRESENT ILLNESS
79 M hx of afib on xarelto, htn, esophageal neoplasm, crohns, T2DM, sacral ulcer, here for lethargy/AMS and hypotension in NH noticed this morning. Was fine yesterday evening. Lethargic, hypotensive this morning, tachy 160s. Pt rousable but not answering questions. BP improved after 200 cc IVF en route. A&O x 4 at baseline    In the ED:   VS: T 100.4, ->88, /81, RR 16, Sat 100 in RA   Labs: Wbc 7.42 wiht neutrophil predominance. Hgb 10.3, Troponin 103->91, lactate 2.2->1.3, Bicarb 20, AG 17, creatinine 0.99, BUn 56, Glucose 2447, Alk phopsh 176, AST 9, ALT <5, UA: WBC 2, Bacteria negative, Cast 14  (Granular), Glucose 500, RVP negative   Imaging:  - CT head non-con: significant for severe chronic microvascular ischemic changes. No acute pathology   -CT a/p:  Left lower lobe pneumonia. A portion of the pancreatic tail herniating into the intrathoracic  space via esophageal hiatus. Ill-defined hypoattenuation of the  pancreatic head. The evaluation is limited secondary to motion artifact.  If prior studies are available, comparison can be made to evaluate the  stability of the hypoattenuation of the pancreatic head. Otherwise, if clinically warranted, abdominal MR can be considered. In the medial aspect of the second portion of the duodenum, there are  few air pockets, which may represent air within the duodenal diverticulum   or contained perforation. Evaluation limited without oral contrast. Moderate amount of steatorrhea. Skin thickening and subcutaneous fat stranding/small collection in the sacrum, consistent with sacral decubitus ulcer. No CT evidence of   osteomyelitis.  EKG: , QTc 398, regular narrow complex tachycardia, likely SVT   Intervnetions: NS 2L bolus, Zosyn 3.375, vancomycin 1g 1x  Consults: surgery 79 M hx of Afib on Xarelto, htn, esophageal neoplasm, crohns, T2DM, sacral ulcer, here for lethargy/AMS and hypotension in NH noticed this morning. Was fine yesterday evening. Lethargic, hypotensive this morning, tachy 160s. Pt rousable but not answering questions. BP improved after 200 cc IVF en route. A&O x 4 at baseline    In the ED:   VS: T 100.4, ->88, /81, RR 16, Sat 100 in RA   Labs: Wbc 7.42 wiht neutrophil predominance. Hgb 10.3, Troponin 103->91, lactate 2.2->1.3, Bicarb 20, AG 17, creatinine 0.99, BUn 56, Glucose 2447, Alk phopsh 176, AST 9, ALT <5, UA: WBC 2, Bacteria negative, Cast 14  (Granular), Glucose 500, RVP negative   Imaging:  - CT head non-con: significant for severe chronic microvascular ischemic changes. No acute pathology   -CT a/p:  Left lower lobe pneumonia. A portion of the pancreatic tail herniating into the intrathoracic  space via esophageal hiatus. Ill-defined hypoattenuation of the  pancreatic head. The evaluation is limited secondary to motion artifact.  If prior studies are available, comparison can be made to evaluate the  stability of the hypoattenuation of the pancreatic head. Otherwise, if clinically warranted, abdominal MR can be considered. In the medial aspect of the second portion of the duodenum, there are  few air pockets, which may represent air within the duodenal diverticulum   or contained perforation. Evaluation limited without oral contrast. Moderate amount of steatorrhea. Skin thickening and subcutaneous fat stranding/small collection in the sacrum, consistent with sacral decubitus ulcer. No CT evidence of   osteomyelitis.  EKG: , QTc 398, regular narrow complex tachycardia, likely SVT   Intervnetions: NS 2L bolus, Zosyn 3.375, vancomycin 1g 1x  Consults: surgery 79 M hx of Afib on Xarelto, HTN, Esophageal Cancer (in remission since early 2000s), ?CVA,  Crohns, Chronic colitis, T2DM, recent femur fracture in Oct 2024 was sent in from [facility] rehab for for lethargy/AMS and hypotension, tachycardic to 160s at [facility]  noticed this morning. As per report, patient was fine yesterday evening.  BP improved after 200 cc IVF en route.   As per daughter at bedside, she last saw patient 5 days ago and he seemed to be a bit more tired and confused then baseline. At baseline, he is  AOx3 however typically asks questions multiple times or gets confused on conversation. The daughter felt like he was "not himself" when she last saw him.     In the ED:   VS: T 100.4, ->88, /81, RR 16, Sat 100 in RA   Labs: Wbc 7.42 wiht neutrophil predominance. Hgb 10.3, Troponin 103->91, lactate 2.2->1.3, Bicarb 20, AG 17, creatinine 0.99, BUn 56, Glucose 2447, Alk phopsh 176, AST 9, ALT <5, UA: WBC 2, Bacteria negative, Cast 14  (Granular), Glucose 500, RVP negative   Imaging:  - CT head non-con: significant for severe chronic microvascular ischemic changes. No acute pathology   -CT a/p:  Left lower lobe pneumonia. A portion of the pancreatic tail herniating into the intrathoracic  space via esophageal hiatus. Ill-defined hypoattenuation of the  pancreatic head. The evaluation is limited secondary to motion artifact.  If prior studies are available, comparison can be made to evaluate the  stability of the hypoattenuation of the pancreatic head. Otherwise, if clinically warranted, abdominal MR can be considered. In the medial aspect of the second portion of the duodenum, there are  few air pockets, which may represent air within the duodenal diverticulum   or contained perforation. Evaluation limited without oral contrast. Moderate amount of steatorrhea. Skin thickening and subcutaneous fat stranding/small collection in the sacrum, consistent with sacral decubitus ulcer. No CT evidence of   osteomyelitis.  EKG: , QTc 398, regular narrow complex tachycardia, likely SVT   Intervnetions: NS 2L bolus, Zosyn 3.375, vancomycin 1g 1x  Consults: surgery 79 M hx of Afib on Xarelto, HTN, Esophageal Cancer (in remission since early 2000s), ?CVA,  Crohns, Chronic colitis, T2DM, recent femur fracture in Oct 2024 was sent in from [facility] rehab for for lethargy/AMS and hypotension, tachycardic to 160s at [facility]  noticed this morning. As per report, patient was fine yesterday evening.  BP improved after 200 cc IVF en route.   As per daughter at bedside, she last saw patient 5 days ago and he seemed to be a bit more tired and confused then baseline. At baseline, he is  AOx3 however typically asks questions multiple times or gets confused on conversation. The daughter felt like he was "not himself" when she last saw him. As per daughter, patient had a fall in Oct 2024, was found to have femur fracture and had surgery for this, he was then sent to [facility] where he had been until coming to ED. She reports before the fall, he lived alone and walked with walker. While at rehab he has been bed-bound and developed a sacral ulcer about 3 weeks ago. Of note patient was started on oral abx Keflex on 11/27, unclear indication     In the ED:   VS: T 100.4, ->88, /81, RR 16, Sat 100 in RA   Labs: Wbc 7.42 wiht neutrophil predominance. Hgb 10.3, Troponin 103->91, lactate 2.2->1.3, Bicarb 20, AG 17, creatinine 0.99, BUn 56, Glucose 2447, Alk phopsh 176, AST 9, ALT <5, UA: WBC 2, Bacteria negative, Cast 14  (Granular), Glucose 500, RVP negative   Imaging:  - CT head non-con: significant for severe chronic microvascular ischemic changes. No acute pathology   -CT a/p:  Left lower lobe pneumonia. A portion of the pancreatic tail herniating into the intrathoracic  space via esophageal hiatus. Ill-defined hypoattenuation of the  pancreatic head. The evaluation is limited secondary to motion artifact.  If prior studies are available, comparison can be made to evaluate the  stability of the hypoattenuation of the pancreatic head. Otherwise, if clinically warranted, abdominal MR can be considered. In the medial aspect of the second portion of the duodenum, there are  few air pockets, which may represent air within the duodenal diverticulum   or contained perforation. Evaluation limited without oral contrast. Moderate amount of steatorrhea. Skin thickening and subcutaneous fat stranding/small collection in the sacrum, consistent with sacral decubitus ulcer. No CT evidence of   osteomyelitis.  EKG: , QTc 398, regular narrow complex tachycardia, likely SVT   Interventions NS 2L bolus, Zosyn 3.375, vancomycin 1g 1x  Consults: surgery

## 2024-11-28 NOTE — CONSULT NOTE ADULT - SUBJECTIVE AND OBJECTIVE BOX
80yo Male pt with PMH of Afib (on Xarelto), HTN, T2DM, Chron's Disease, dementia, depression, esophageal carcinoma (remission since early 2000s), significant hearing loss, and femur fracture (10/2024); and PSH of distal esopahgectomy?. Patient was brought to ED from [facility] for lethargy, altered mental status, tachycardic and hypotension during the morning. Patient was resuscitated in the ED, and admitted under medicine service under Sepsis with possible sources from Left lower lobe pneumonia, UTI or CT showing collection on sacral decubitus ulcer.     Patient examine on the floor, pt afebrile, HR 93, /37, Satting well on room air. On exam, abdomen soft, NT, ND, no guarding, no rigidity with upper midline incision, well healed. Sacrum with sacral decubitus ulcer 5x6cm with minimal necrotic tissue, inferior eschar, and surrounding erythema; with no palpable fluctuance. Labs significant for WBC 7.42, Hgb 10.3, Plt 288, Na 139, K+ 4.7, BUN/Cr 56/0.99, Glu 247, , LA (1.3) 2.2, UA with granular and hyaline casts, Glu 500, WBC 2 HPF . CTAP showing In the medial aspect of the second portion of the duodenum, there are few air pockets, which may represent air within the duodenal diverticulum or contained perforation. Skin thickening and subcutaneous fat stranding/small collection in the sacrum, consistent with sacral decubitus ulcer. No CT evidence of osteomyelitis.     PMH: of Afib (on Xarelto), HTN, T2DM, Chron's Disease, dementia, depression, esophageal carcinoma(remission since early 2000s),   PSHx: Distal esophagectomy?  Medications: Losartan 50mg, Xarelto 20mg, Mirtazapine 15mg, Aligliptin 25mg qd, Glucophage 100mg BID, Glargine 5u qd, Loperamide 2mg PRN, Metorpolol succinate 25mg qd, Ferrous sulfate qd, Omeprazole 40mg, Cholecalciferol 50mcg qd  Allergies: NKDA  Family Hx: Denies family hx of IBS, Crohn's, UC, or colon cancer.    T(C): 37.6 (11-28-24 @ 19:44), Max: 38 (11-28-24 @ 11:45)  HR: 99 (11-28-24 @ 19:44) (76 - 166)  BP: 139/76 (11-28-24 @ 19:44) (91/65 - 139/76)  RR: 18 (11-28-24 @ 19:44) (16 - 20)  SpO2: 98% (11-28-24 @ 19:44) (95% - 100%)    Physical Exam  General: AAOx1, NAD, laying comfortably in bed  Cardio: S1,S2, No MRG  Pulm: Nonlabored breathing  Abdomen: , NT, ND, no guarding, no rigidity with upper midline incision, well healed. Sacrum: sacral decubitus ulcer 5x6cm with minimal necrotic tissue, inferior eschar, and surrounding erythema; with no palpable fluctuance  Extremities: WWP, peripheral pulses appreciated      LABS:                        10.3   7.42  )-----------( 288      ( 28 Nov 2024 12:13 )             33.0     11-28    139  |  102  |  56[H]  ----------------------------<  247[H]  4.7   |  20[L]  |  0.99    Ca    9.2      28 Nov 2024 12:13    TPro  7.5  /  Alb  3.5  /  TBili  0.3  /  DBili  x   /  AST  9[L]  /  ALT  <5[L]  /  AlkPhos  176[H]  11-28    PT/INR - ( 28 Nov 2024 12:13 )   PT: 13.4 sec;   INR: 1.15          PTT - ( 28 Nov 2024 12:13 )  PTT:29.6 sec        < from: CT Abdomen and Pelvis w/ IV Cont (11.28.24 @ 14:37) >    ACC: [id number] EXAM:  CT ABDOMEN AND PELVIS IC   ORDERED BY: LIUDMILA VIGIL     PROCEDURE DATE:  11/28/2024          INTERPRETATION:  CLINICAL INFORMATION: Abdominal pain. Sacral ulcer.    COMPARISON: None.    CONTRAST/COMPLICATIONS:  IV Contrast: Isovue 370  90 cc administered   10 cc discarded  Oral Contrast: NONE      PROCEDURE:  CT of the Abdomen and Pelvis was performed.  Sagittal and coronal reformats were performed.    FINDINGS:  LOWER CHEST: Left lower lobe consolidative opacity and rightlower lobe   subsegmental atelectasis.    LIVER: Within normal limits.  BILE DUCTS: Normal caliber.  GALLBLADDER: Distended.  SPLEEN: Within normal limits.  PANCREAS: A portion of the pancreatic tail herniating into the   intrathoracic space via esophageal hiatus. Ill-defined hypoattenuation of   the pancreatic head. The evaluation is limited secondary to motion   artifact.  ADRENALS: Within normal limits.  KIDNEYS/URETERS: Right renal simple cyst. Left renal subcentimeter   hypodense foci, too small to characterize. No hydronephrosis. Symmetric   renal enhancement.    BLADDER: Right posterior lateral bladder diverticulum.  REPRODUCTIVE ORGANS: Enlarged prostate with coarse calcification.    BOWEL: Intrathoracic stomach. In the medial aspect of the second portion   of the duodenum, there are few air pockets, which may represent air   within the duodenal diverticulum or contained perforation. Evaluation   limited without oral contrast. Moderate amount of steatorrhea.  PERITONEUM/RETROPERITONEUM: Within normal limits.  VESSELS: Atherosclerotic changes.  LYMPH NODES: No lymphadenopathy.  ABDOMINAL WALL: Skin thickening and subcutaneous fat stranding/small   collection in the sacrum, consistent with sacral decubitus ulcer.  BONES: No evidence of osteomyelitis. Degenerative changes. Severe   compression deformity of L2. Right femoral ORIF.    IMPRESSION:  1.  Left lower lobe pneumonia.  2.  A portion of the pancreatic tail herniating into the intrathoracic   space via esophageal hiatus. Ill-defined hypoattenuation of the   pancreatic head. The evaluation is limited secondary to motion artifact.   If prior studies are available, comparison can be made to evaluate the   stability of the hypoattenuation of the pancreatic head. Otherwise, if   clinically warranted, abdominal MR can be considered.  3.  In the medial aspect of the second portion of the duodenum, there are   few air pockets, which may represent air within the duodenal diverticulum   or contained perforation. Evaluation limited without oral contrast.  4.  Moderate amount of steatorrhea.  5.  Skin thickening and subcutaneous fat stranding/small collection in   the sacrum, consistent with sacral decubitus ulcer. No CT evidence of   osteomyelitis.      --- End of Report ---            EUGENIO HERNANDEZ MD; Attending Radiologist  This document has been electronically signed. Nov 28 2024  2:54PM    < end of copied text >   80yo Male pt with PMH of Afib (on Xarelto), HTN, T2DM, Chron's Disease, dementia, depression, esophageal carcinoma (remission since early 2000s), significant hearing loss, and femur fracture (10/2024), stroke?; and PSH of distal esophagectomy Patient was brought to ED from [facility] for lethargy, altered mental status, tachycardic and hypotension during the morning. Patient was resuscitated in the ED, and admitted under medicine service under Sepsis with possible sources from Left lower lobe pneumonia, UTI or CT showing collection on sacral decubitus ulcer.     Patient examine on the floor, pt afebrile, HR 93, /37, Satting well on room air. On exam, abdomen soft, NT, ND, no guarding, no rigidity with upper midline incision, well healed. Sacrum with sacral decubitus ulcer 5x6cm with minimal necrotic tissue, inferior eschar, and surrounding erythema; with no palpable fluctuance. Labs significant for WBC 7.42, Hgb 10.3, Plt 288, Na 139, K+ 4.7, BUN/Cr 56/0.99, Glu 247, , LA (1.3) 2.2, UA with granular and hyaline casts, Glu 500, WBC 2 HPF . CTAP showing In the medial aspect of the second portion of the duodenum, there are few air pockets, which may represent air within the duodenal diverticulum or contained perforation. Skin thickening and subcutaneous fat stranding/small collection in the sacrum, consistent with sacral decubitus ulcer. No CT evidence of osteomyelitis.     - Unable to obtain history from patient 2/2 mental status    PMH: of Afib (on Xarelto), HTN, T2DM, Chron's Disease, dementia, depression, esophageal carcinoma(remission since early 2000s), stroke?  PSHx: Distal esophagectomy?  Medications: Losartan 50mg, Xarelto 20mg, Mirtazapine 15mg, Aligliptin 25mg qd, Glucophage 100mg BID, Glargine 5u qd, Loperamide 2mg PRN, Metorpolol succinate 25mg qd, Ferrous sulfate qd, Omeprazole 40mg, Cholecalciferol 50mcg qd  Allergies: NKDA  Family Hx: Denies family hx of IBS, Crohn's, UC, or colon cancer.    T(C): 37.6 (11-28-24 @ 19:44), Max: 38 (11-28-24 @ 11:45)  HR: 99 (11-28-24 @ 19:44) (76 - 166)  BP: 139/76 (11-28-24 @ 19:44) (91/65 - 139/76)  RR: 18 (11-28-24 @ 19:44) (16 - 20)  SpO2: 98% (11-28-24 @ 19:44) (95% - 100%)    Physical Exam  General: AAOx1, NAD, laying comfortably in bed  Cardio: S1,S2, No MRG  Pulm: Nonlabored breathing  Abdomen: , NT, ND, no guarding, no rigidity with upper midline incision, well healed. Sacrum: sacral decubitus ulcer 5x6cm with minimal necrotic tissue, inferior eschar, and surrounding erythema; with no palpable fluctuance  Extremities: WWP, peripheral pulses appreciated      LABS:                        10.3   7.42  )-----------( 288      ( 28 Nov 2024 12:13 )             33.0     11-28    139  |  102  |  56[H]  ----------------------------<  247[H]  4.7   |  20[L]  |  0.99    Ca    9.2      28 Nov 2024 12:13    TPro  7.5  /  Alb  3.5  /  TBili  0.3  /  DBili  x   /  AST  9[L]  /  ALT  <5[L]  /  AlkPhos  176[H]  11-28    PT/INR - ( 28 Nov 2024 12:13 )   PT: 13.4 sec;   INR: 1.15          PTT - ( 28 Nov 2024 12:13 )  PTT:29.6 sec        < from: CT Abdomen and Pelvis w/ IV Cont (11.28.24 @ 14:37) >    ACC: [id number] EXAM:  CT ABDOMEN AND PELVIS IC   ORDERED BY: LIUDMILA VIGIL     PROCEDURE DATE:  11/28/2024          INTERPRETATION:  CLINICAL INFORMATION: Abdominal pain. Sacral ulcer.    COMPARISON: None.    CONTRAST/COMPLICATIONS:  IV Contrast: Isovue 370  90 cc administered   10 cc discarded  Oral Contrast: NONE      PROCEDURE:  CT of the Abdomen and Pelvis was performed.  Sagittal and coronal reformats were performed.    FINDINGS:  LOWER CHEST: Left lower lobe consolidative opacity and rightlower lobe   subsegmental atelectasis.    LIVER: Within normal limits.  BILE DUCTS: Normal caliber.  GALLBLADDER: Distended.  SPLEEN: Within normal limits.  PANCREAS: A portion of the pancreatic tail herniating into the   intrathoracic space via esophageal hiatus. Ill-defined hypoattenuation of   the pancreatic head. The evaluation is limited secondary to motion   artifact.  ADRENALS: Within normal limits.  KIDNEYS/URETERS: Right renal simple cyst. Left renal subcentimeter   hypodense foci, too small to characterize. No hydronephrosis. Symmetric   renal enhancement.    BLADDER: Right posterior lateral bladder diverticulum.  REPRODUCTIVE ORGANS: Enlarged prostate with coarse calcification.    BOWEL: Intrathoracic stomach. In the medial aspect of the second portion   of the duodenum, there are few air pockets, which may represent air   within the duodenal diverticulum or contained perforation. Evaluation   limited without oral contrast. Moderate amount of steatorrhea.  PERITONEUM/RETROPERITONEUM: Within normal limits.  VESSELS: Atherosclerotic changes.  LYMPH NODES: No lymphadenopathy.  ABDOMINAL WALL: Skin thickening and subcutaneous fat stranding/small   collection in the sacrum, consistent with sacral decubitus ulcer.  BONES: No evidence of osteomyelitis. Degenerative changes. Severe   compression deformity of L2. Right femoral ORIF.    IMPRESSION:  1.  Left lower lobe pneumonia.  2.  A portion of the pancreatic tail herniating into the intrathoracic   space via esophageal hiatus. Ill-defined hypoattenuation of the   pancreatic head. The evaluation is limited secondary to motion artifact.   If prior studies are available, comparison can be made to evaluate the   stability of the hypoattenuation of the pancreatic head. Otherwise, if   clinically warranted, abdominal MR can be considered.  3.  In the medial aspect of the second portion of the duodenum, there are   few air pockets, which may represent air within the duodenal diverticulum   or contained perforation. Evaluation limited without oral contrast.  4.  Moderate amount of steatorrhea.  5.  Skin thickening and subcutaneous fat stranding/small collection in   the sacrum, consistent with sacral decubitus ulcer. No CT evidence of   osteomyelitis.      --- End of Report ---            EUGENIO HERNANDEZ MD; Attending Radiologist  This document has been electronically signed. Nov 28 2024  2:54PM    < end of copied text >

## 2024-11-28 NOTE — ED PROVIDER NOTE - CLINICAL SUMMARY MEDICAL DECISION MAKING FREE TEXT BOX
79 M hx of afib on xarelto, htn, esophageal neoplasm, crohns, T2DM, sacral ulcer, full code here for lethargy/AMS and hypotension in NH noticed this morning. Was fine yesterday evening. Lethargic, hypotensive this morning, tachy 160s. Pt rousable but not answering questions. BP improved after 200 cc IVF en route.  A&O x 4 at baseline    Low grade fever, tachy, foul smelling ulcer  c/f AMS 2/2 sepsis, however consider ICH on plavix, metabolic derangements, dehydration  plan: labs, CT imaging, UA, empiric abx, tylenol, IVF, Will consider rate cntrl if HR does not respond to hydration.

## 2024-11-28 NOTE — ED ADULT NURSE NOTE - OBJECTIVE STATEMENT
Patient sent from [facility], as per staff patient normally a/oX 3, noted this morning to have AMS, w/ hypotensive BP and elevated HR, no chest pain, no SOB complaint.  Extensive PMHxHTN, Afib, Hx of CVA Patient sent from [facility], as per staff patient normally a/oX 3, noted this morning to have AMS, w/ hypotensive BP and elevated HR, no chest pain, no SOB complaint.  Extensive PMHxHTN, Afib, Hx of CVA, esophageal CA, Hx of falling, depression, kCrohns, DM, muscle weakness,  IMmediate upgrad to Dr. Mcallister.

## 2024-11-28 NOTE — ED ADULT NURSE REASSESSMENT NOTE - NS ED NURSE REASSESS COMMENT FT1
Patient back from CT scan, asleep, does not appear to be in any pain, SOB or distress.  Afib on cardiac monitor, vital signs stble.  Additional labs repeat lactate, Trop sent to lab.  Repositioned for comfort.  For admit.

## 2024-11-28 NOTE — H&P ADULT - NSICDXPASTMEDICALHX_GEN_ALL_CORE_FT
PAST MEDICAL HISTORY:  Afib     Crohn disease     DM (diabetes mellitus)     Esophageal cancer     HTN (hypertension)

## 2024-11-28 NOTE — H&P ADULT - PROBLEM SELECTOR PLAN 3
In the medial aspect of the second portion of the duodenum, there are   few air pockets, which may represent air within the duodenal diverticulum   or contained perforation On CT found to have: In the medial aspect of the second portion of the duodenum, there are few air pockets, which may represent air within the duodenal diverticulum or contained perforation  Abdomen with mild diffuse tenderness on exam  As per ED provider Surgery was consulted and recommended no surgical intervention     Plan:   -f/u final surgery recs On presentation HR 160s. EKG: , QTc 398, regular narrow complex tachycardia, likely SVT. Troponin downtrended Troponin 103->91. On admission VS stable s/p fluids HR 99, /76   Loud systolic murmur on R Sternal Border   Home meds: Metoprolol succinate 25 qd and Xarelto 20 qd    Plan:   -Repeat EKG  -c/w Metoprolol succinate 25 qd and Xarelto 20 qd  -obtain TTE

## 2024-11-28 NOTE — ED ADULT NURSE NOTE - NSFALLRISKINTERV_ED_ALL_ED
Assistance OOB with selected safe patient handling equipment if applicable/Assistance with ambulation/Communicate fall risk and risk factors to all staff, patient, and family/Monitor gait and stability/Monitor for mental status changes and reorient to person, place, and time, as needed/Move patient closer to nursing station/within visual sight of ED staff/Provide patient with walking aids/Provide visual cue: yellow wristband, yellow gown, etc/Reinforce activity limits and safety measures with patient and family/Toileting schedule using arm’s reach rule for commode and bathroom/Use of alarms - bed, stretcher, chair and/or video monitoring/Call bell, personal items and telephone in reach/Instruct patient to call for assistance before getting out of bed/chair/stretcher/Non-slip footwear applied when patient is off stretcher/[place] to call system/Physically safe environment - no spills, clutter or unnecessary equipment/Purposeful Proactive Rounding/Room/bathroom lighting operational, light cord in reach

## 2024-11-28 NOTE — ED PROVIDER NOTE - PROGRESS NOTE DETAILS
HR and BP improved with IVF. S/p abx, concern for LLL pna vs sacral ulcer as source. Surgery informed of possible contained perf, nothing to do acutely but they will eval pt further.

## 2024-11-28 NOTE — H&P ADULT - PROBLEM SELECTOR PLAN 1
PNa given L lower lobe opacity  vs Sacral ulcer vs Pancreatic Hypoattenuation     Plan:     -surgerery recs regarding possible drain of sacracl ulcer collection Patient presented with severe sepsis: Fever 100.4, , and elevated Lactate as well as lethargy. Source likely  PNA given L lower lobe opacity  vs Sacral ulcer   CT showed: Left lower lobe pneumonia. Skin thickening and subcutaneous fat stranding/small collection in the sacrum, consistent with sacral decubitus ulcer. No CT evidence of osteomyelitis.  Currently satting well in RA. Sacral Ulcer noted with foul smell purulent discharge   s/p vancomycin and zosyn and 2L of NS in the ED    Plan:   -c/w vancomycin   -increase Zosyn to 4.5 q8hr for PSA coverage given DM   -75cc/hr of LR  for 10 hours   -f/u Blood cultures  -collect sputum culture if producing   -wound care  -surgery recs regarding possible drain of sacral ulcer collection  -PT/OT consult , nutrition consult

## 2024-11-28 NOTE — H&P ADULT - NSHPLABSRESULTS_GEN_ALL_CORE
.  LABS:                         10.3   7.42  )-----------( 288      ( 28 Nov 2024 12:13 )             33.0     11-28    139  |  102  |  56[H]  ----------------------------<  247[H]  4.7   |  20[L]  |  0.99    Ca    9.2      28 Nov 2024 12:13    TPro  7.5  /  Alb  3.5  /  TBili  0.3  /  DBili  x   /  AST  9[L]  /  ALT  <5[L]  /  AlkPhos  176[H]  11-28    PT/INR - ( 28 Nov 2024 12:13 )   PT: 13.4 sec;   INR: 1.15          PTT - ( 28 Nov 2024 12:13 )  PTT:29.6 sec  Urinalysis Basic - ( 28 Nov 2024 12:13 )    Color: x / Appearance: x / SG: x / pH: x  Gluc: 247 mg/dL / Ketone: x  / Bili: x / Urobili: x   Blood: x / Protein: x / Nitrite: x   Leuk Esterase: x / RBC: x / WBC x   Sq Epi: x / Non Sq Epi: x / Bacteria: x            Lactate, Blood: 1.3 mmol/L (11-28 @ 14:17)  Lactate, Blood: 2.2 mmol/L (11-28 @ 12:13)      RADIOLOGY, EKG & ADDITIONAL TESTS: Reviewed.

## 2024-11-29 ENCOUNTER — RESULT REVIEW (OUTPATIENT)
Age: 79
End: 2024-11-29

## 2024-11-29 DIAGNOSIS — A49.8 OTHER BACTERIAL INFECTIONS OF UNSPECIFIED SITE: ICD-10-CM

## 2024-11-29 DIAGNOSIS — R78.81 BACTEREMIA: ICD-10-CM

## 2024-11-29 LAB
-  CTX-M RESISTANCE MARKER: SIGNIFICANT CHANGE UP
-  ESBL: SIGNIFICANT CHANGE UP
A1C WITH ESTIMATED AVERAGE GLUCOSE RESULT: 7.5 % — HIGH (ref 4–5.6)
ADD ON TEST-SPECIMEN IN LAB: SIGNIFICANT CHANGE UP
ALBUMIN SERPL ELPH-MCNC: 3.1 G/DL — LOW (ref 3.3–5)
ALP SERPL-CCNC: 148 U/L — HIGH (ref 40–120)
ALT FLD-CCNC: <5 U/L — LOW (ref 10–45)
ANION GAP SERPL CALC-SCNC: 13 MMOL/L — SIGNIFICANT CHANGE UP (ref 5–17)
AST SERPL-CCNC: 7 U/L — LOW (ref 10–40)
BASOPHILS # BLD AUTO: 0.05 K/UL — SIGNIFICANT CHANGE UP (ref 0–0.2)
BASOPHILS NFR BLD AUTO: 0.8 % — SIGNIFICANT CHANGE UP (ref 0–2)
BILIRUB SERPL-MCNC: 0.2 MG/DL — SIGNIFICANT CHANGE UP (ref 0.2–1.2)
BUN SERPL-MCNC: 39 MG/DL — HIGH (ref 7–23)
CALCIUM SERPL-MCNC: 8.8 MG/DL — SIGNIFICANT CHANGE UP (ref 8.4–10.5)
CHLORIDE SERPL-SCNC: 106 MMOL/L — SIGNIFICANT CHANGE UP (ref 96–108)
CO2 SERPL-SCNC: 21 MMOL/L — LOW (ref 22–31)
CREAT SERPL-MCNC: 0.84 MG/DL — SIGNIFICANT CHANGE UP (ref 0.5–1.3)
EGFR: 89 ML/MIN/1.73M2 — SIGNIFICANT CHANGE UP
EOSINOPHIL # BLD AUTO: 0 K/UL — SIGNIFICANT CHANGE UP (ref 0–0.5)
EOSINOPHIL NFR BLD AUTO: 0 % — SIGNIFICANT CHANGE UP (ref 0–6)
ESTIMATED AVERAGE GLUCOSE: 169 MG/DL — HIGH (ref 68–114)
FERRITIN SERPL-MCNC: 488 NG/ML — HIGH (ref 30–400)
FOLATE SERPL-MCNC: 7.8 NG/ML — SIGNIFICANT CHANGE UP
GLUCOSE SERPL-MCNC: 259 MG/DL — HIGH (ref 70–99)
GRAM STN FLD: ABNORMAL
HCT VFR BLD CALC: 28.4 % — LOW (ref 39–50)
HGB BLD-MCNC: 8.9 G/DL — LOW (ref 13–17)
IRON SATN MFR SERPL: 14 UG/DL — LOW (ref 45–165)
IRON SATN MFR SERPL: 9 % — LOW (ref 16–55)
LYMPHOCYTES # BLD AUTO: 0.05 K/UL — LOW (ref 1–3.3)
LYMPHOCYTES # BLD AUTO: 0.9 % — LOW (ref 13–44)
MAGNESIUM SERPL-MCNC: 1.7 MG/DL — SIGNIFICANT CHANGE UP (ref 1.6–2.6)
MCHC RBC-ENTMCNC: 28.5 PG — SIGNIFICANT CHANGE UP (ref 27–34)
MCHC RBC-ENTMCNC: 31.3 G/DL — LOW (ref 32–36)
MCV RBC AUTO: 91 FL — SIGNIFICANT CHANGE UP (ref 80–100)
METHOD TYPE: SIGNIFICANT CHANGE UP
MONOCYTES # BLD AUTO: 0.2 K/UL — SIGNIFICANT CHANGE UP (ref 0–0.9)
MONOCYTES NFR BLD AUTO: 3.5 % — SIGNIFICANT CHANGE UP (ref 2–14)
MRSA PCR RESULT.: NEGATIVE — SIGNIFICANT CHANGE UP
NEUTROPHILS # BLD AUTO: 5.42 K/UL — SIGNIFICANT CHANGE UP (ref 1.8–7.4)
NEUTROPHILS NFR BLD AUTO: 94.8 % — HIGH (ref 43–77)
PHOSPHATE SERPL-MCNC: 2.5 MG/DL — SIGNIFICANT CHANGE UP (ref 2.5–4.5)
PLATELET # BLD AUTO: 268 K/UL — SIGNIFICANT CHANGE UP (ref 150–400)
POTASSIUM SERPL-MCNC: 4.4 MMOL/L — SIGNIFICANT CHANGE UP (ref 3.5–5.3)
POTASSIUM SERPL-SCNC: 4.4 MMOL/L — SIGNIFICANT CHANGE UP (ref 3.5–5.3)
PROT SERPL-MCNC: 6.6 G/DL — SIGNIFICANT CHANGE UP (ref 6–8.3)
PROTEUS SP DNA BLD POS QL NAA+NON-PROBE: SIGNIFICANT CHANGE UP
RBC # BLD: 3.12 M/UL — LOW (ref 4.2–5.8)
RBC # BLD: 3.12 M/UL — LOW (ref 4.2–5.8)
RBC # FLD: 14.4 % — SIGNIFICANT CHANGE UP (ref 10.3–14.5)
RETICS #: 17.2 K/UL — LOW (ref 25–125)
RETICS/RBC NFR: 0.6 % — SIGNIFICANT CHANGE UP (ref 0.5–2.5)
S AUREUS DNA NOSE QL NAA+PROBE: POSITIVE
SODIUM SERPL-SCNC: 140 MMOL/L — SIGNIFICANT CHANGE UP (ref 135–145)
SPECIMEN SOURCE: SIGNIFICANT CHANGE UP
SPECIMEN SOURCE: SIGNIFICANT CHANGE UP
TIBC SERPL-MCNC: 148 UG/DL — LOW (ref 220–430)
UIBC SERPL-MCNC: 134 UG/DL — SIGNIFICANT CHANGE UP (ref 110–370)
VIT B12 SERPL-MCNC: 339 PG/ML — SIGNIFICANT CHANGE UP (ref 232–1245)
WBC # BLD: 5.72 K/UL — SIGNIFICANT CHANGE UP (ref 3.8–10.5)
WBC # FLD AUTO: 5.72 K/UL — SIGNIFICANT CHANGE UP (ref 3.8–10.5)

## 2024-11-29 PROCEDURE — 99222 1ST HOSP IP/OBS MODERATE 55: CPT

## 2024-11-29 PROCEDURE — 93010 ELECTROCARDIOGRAM REPORT: CPT

## 2024-11-29 PROCEDURE — 99233 SBSQ HOSP IP/OBS HIGH 50: CPT | Mod: GC

## 2024-11-29 PROCEDURE — 93306 TTE W/DOPPLER COMPLETE: CPT | Mod: 26

## 2024-11-29 RX ORDER — ERTAPENEM 1 G/1
1000 INJECTION, POWDER, LYOPHILIZED, FOR SOLUTION INTRAMUSCULAR; INTRAVENOUS EVERY 24 HOURS
Refills: 0 | Status: DISCONTINUED | OUTPATIENT
Start: 2024-11-29 | End: 2024-11-29

## 2024-11-29 RX ORDER — ERTAPENEM 1 G/1
INJECTION, POWDER, LYOPHILIZED, FOR SOLUTION INTRAMUSCULAR; INTRAVENOUS
Refills: 0 | Status: DISCONTINUED | OUTPATIENT
Start: 2024-11-29 | End: 2024-12-03

## 2024-11-29 RX ORDER — CEFEPIME 2 G/1
2000 INJECTION, POWDER, FOR SOLUTION INTRAVENOUS ONCE
Refills: 0 | Status: DISCONTINUED | OUTPATIENT
Start: 2024-11-29 | End: 2024-11-29

## 2024-11-29 RX ORDER — LANOLIN ALCOHOL/MO/W.PET/CERES
100 CREAM (GRAM) TOPICAL DAILY
Refills: 0 | Status: DISCONTINUED | OUTPATIENT
Start: 2024-11-29 | End: 2024-12-04

## 2024-11-29 RX ORDER — COLLAGENASE SANTYL 250 [ARB'U]/G
1 OINTMENT TOPICAL DAILY
Refills: 0 | Status: DISCONTINUED | OUTPATIENT
Start: 2024-11-29 | End: 2024-12-04

## 2024-11-29 RX ORDER — 0.9 % SODIUM CHLORIDE 0.9 %
1000 INTRAVENOUS SOLUTION INTRAVENOUS
Refills: 0 | Status: DISCONTINUED | OUTPATIENT
Start: 2024-11-29 | End: 2024-12-01

## 2024-11-29 RX ORDER — VANCOMYCIN HCL 900 MCG/MG
1250 POWDER (GRAM) MISCELLANEOUS ONCE
Refills: 0 | Status: DISCONTINUED | OUTPATIENT
Start: 2024-11-29 | End: 2024-11-29

## 2024-11-29 RX ORDER — ZINC SULFATE 50(220)MG
220 TABLET ORAL DAILY
Refills: 0 | Status: DISCONTINUED | OUTPATIENT
Start: 2024-11-29 | End: 2024-12-04

## 2024-11-29 RX ORDER — ERTAPENEM 1 G/1
1000 INJECTION, POWDER, LYOPHILIZED, FOR SOLUTION INTRAMUSCULAR; INTRAVENOUS EVERY 24 HOURS
Refills: 0 | Status: DISCONTINUED | OUTPATIENT
Start: 2024-11-30 | End: 2024-12-03

## 2024-11-29 RX ORDER — MULTIVIT WITH MINERALS/LUTEIN
500 TABLET ORAL
Refills: 0 | Status: DISCONTINUED | OUTPATIENT
Start: 2024-11-29 | End: 2024-12-04

## 2024-11-29 RX ORDER — PIPERACILLIN SODIUM AND TAZOBACTAM SODIUM 4; .5 G/20ML; G/20ML
4.5 INJECTION, POWDER, LYOPHILIZED, FOR SOLUTION INTRAVENOUS EVERY 8 HOURS
Refills: 0 | Status: DISCONTINUED | OUTPATIENT
Start: 2024-11-29 | End: 2024-11-29

## 2024-11-29 RX ORDER — CEFTRIAXONE SODIUM 1 G
2000 VIAL (EA) INJECTION EVERY 24 HOURS
Refills: 0 | Status: DISCONTINUED | OUTPATIENT
Start: 2024-11-29 | End: 2024-11-29

## 2024-11-29 RX ORDER — ERTAPENEM 1 G/1
1000 INJECTION, POWDER, LYOPHILIZED, FOR SOLUTION INTRAMUSCULAR; INTRAVENOUS ONCE
Refills: 0 | Status: COMPLETED | OUTPATIENT
Start: 2024-11-29 | End: 2024-11-29

## 2024-11-29 RX ORDER — CYANOCOBALAMIN/FOLIC AC/VIT B6 1-2.2-25MG
1 TABLET ORAL DAILY
Refills: 0 | Status: DISCONTINUED | OUTPATIENT
Start: 2024-11-29 | End: 2024-12-04

## 2024-11-29 RX ORDER — VANCOMYCIN HCL 900 MCG/MG
1250 POWDER (GRAM) MISCELLANEOUS EVERY 12 HOURS
Refills: 0 | Status: DISCONTINUED | OUTPATIENT
Start: 2024-11-29 | End: 2024-11-29

## 2024-11-29 RX ADMIN — ACETAMINOPHEN 500MG 650 MILLIGRAM(S): 500 TABLET, COATED ORAL at 21:49

## 2024-11-29 RX ADMIN — Medication 100 MILLIGRAM(S): at 12:33

## 2024-11-29 RX ADMIN — Medication 500 MILLIGRAM(S): at 19:22

## 2024-11-29 RX ADMIN — PANTOPRAZOLE SODIUM 40 MILLIGRAM(S): 40 TABLET, DELAYED RELEASE ORAL at 06:50

## 2024-11-29 RX ADMIN — COLLAGENASE SANTYL 1 APPLICATION(S): 250 OINTMENT TOPICAL at 19:22

## 2024-11-29 RX ADMIN — Medication 1000 UNIT(S): at 12:33

## 2024-11-29 RX ADMIN — Medication 2: at 18:00

## 2024-11-29 RX ADMIN — Medication 75 MILLILITER(S): at 12:34

## 2024-11-29 RX ADMIN — Medication 1 TABLET(S): at 12:34

## 2024-11-29 RX ADMIN — ACETAMINOPHEN 500MG 650 MILLIGRAM(S): 500 TABLET, COATED ORAL at 22:30

## 2024-11-29 RX ADMIN — PIPERACILLIN SODIUM AND TAZOBACTAM SODIUM 25 GRAM(S): 4; .5 INJECTION, POWDER, LYOPHILIZED, FOR SOLUTION INTRAVENOUS at 06:50

## 2024-11-29 RX ADMIN — RIVAROXABAN 20 MILLIGRAM(S): 10 TABLET, FILM COATED ORAL at 19:22

## 2024-11-29 RX ADMIN — Medication 75 MILLILITER(S): at 21:49

## 2024-11-29 RX ADMIN — Medication 25 GRAM(S): at 12:34

## 2024-11-29 RX ADMIN — Medication 166.67 MILLIGRAM(S): at 03:17

## 2024-11-29 RX ADMIN — Medication 220 MILLIGRAM(S): at 12:33

## 2024-11-29 RX ADMIN — METOPROLOL TARTRATE 25 MILLIGRAM(S): 100 TABLET, FILM COATED ORAL at 19:22

## 2024-11-29 RX ADMIN — ERTAPENEM 1000 MILLIGRAM(S): 1 INJECTION, POWDER, LYOPHILIZED, FOR SOLUTION INTRAMUSCULAR; INTRAVENOUS at 19:25

## 2024-11-29 RX ADMIN — Medication 6: at 12:35

## 2024-11-29 NOTE — DIETITIAN INITIAL EVALUATION ADULT - HEIGHT FOR BMI (FEET)
Xenograft Text: The defect edges were debeveled with a #15 scalpel blade.  Given the location of the defect, shape of the defect and the proximity to free margins a xenograft was deemed most appropriate.  The graft was then trimmed to fit the size of the defect.  The graft was then placed in the primary defect and oriented appropriately. 5

## 2024-11-29 NOTE — PHYSICAL THERAPY INITIAL EVALUATION ADULT - GENERAL OBSERVATIONS, REHAB EVAL
PT IE completed. Chart reviewed. Pt received semi-supine, NAD, +IV heplock, +[place]. MICHELLE Solomon cleared pt for PT.

## 2024-11-29 NOTE — DIETITIAN INITIAL EVALUATION ADULT - PROBLEM SELECTOR PLAN 2
Lethargic but arousable. Protecting airway. Baseline AOx4. Likely i/s/o severe sepsis 2/2 PNA vs sacral ulcer vs other.    Plan:  -Management as above

## 2024-11-29 NOTE — ADVANCED PRACTICE NURSE CONSULT - RECOMMEDATIONS
Sacrum: soak wound with vashe for 5 minutes. Apply thick layer of Collagenase, cover with vashe moist gauze and foam. Change daily  Right lateral malleolus: swab with [place], leave open to air. Reapply daily    Discussed with primary RN and primary team.   Continue frequent repositioning (repo pillow implemented), offloading heels, optimizing nutrition.     Please reconsult if wound deteriorates or new concerns arise.     Total time spent: 30 minutes

## 2024-11-29 NOTE — SWALLOW BEDSIDE ASSESSMENT ADULT - SWALLOW EVAL: DIAGNOSIS
Patient with seemingly intact oropharyngeal swallow functions with efficient transport and no overt signs & symptoms of airway protection deficits across trials.  Main issue is patient's level of alertness, which can negatively impact his swallow safety.  As such, patient should only be given po when he is awake/alert.

## 2024-11-29 NOTE — OCCUPATIONAL THERAPY INITIAL EVALUATION ADULT - IMPAIRMENTS CONTRIBUTING IMPAIRED BED MOBILITY, REHAB EVAL
impaired balance/cognition/impaired coordination/decreased flexibility/impaired motor control/pain/impaired postural control/decreased strength

## 2024-11-29 NOTE — DIETITIAN INITIAL EVALUATION ADULT - PERTINENT LABORATORY DATA
11-29    140  |  106  |  39[H]  ----------------------------<  259[H]  4.4   |  21[L]  |  0.84    Ca    8.8      29 Nov 2024 07:24  Phos  2.5     11-29  Mg     1.7     11-29    TPro  6.6  /  Alb  3.1[L]  /  TBili  0.2  /  DBili  x   /  AST  7[L]  /  ALT  <5[L]  /  AlkPhos  148[H]  11-29  POCT Blood Glucose.: 236 mg/dL (11-28-24 @ 22:24)  A1C with Estimated Average Glucose Result: 7.5 % (11-29-24 @ 07:24)

## 2024-11-29 NOTE — OCCUPATIONAL THERAPY INITIAL EVALUATION ADULT - MANUAL MUSCLE TESTING RESULTS, REHAB EVAL
unable to perform formal MMT assessment 2/2 impaired cognition however pt with spontaneous movement in all extremities against gravity

## 2024-11-29 NOTE — CONSULT NOTE ADULT - CONSULT REASON
Antibiotic recommendations
Pelvic collection (sacral decubitus ulcer) + Duodenal diverticulum with possible contained perforation

## 2024-11-29 NOTE — DIETITIAN INITIAL EVALUATION ADULT - PROBLEM SELECTOR PLAN 6
On admission: Hgb 10.3, MCV 91.2. No active signs of bleeding. On home iron supplements   As per the daughter had colonoscopy recently without concerning findings     Plan:   -f/u Iron studies. ferritin, B12, folate, reticulocyte count  -Hold iron supplement given sepsis

## 2024-11-29 NOTE — PROGRESS NOTE ADULT - SUBJECTIVE AND OBJECTIVE BOX
SUBJECTIVE: Patient seen and examined bedside. Patient declined examination.    ertapenem  IVPB      ertapenem  IVPB 1000 milliGRAM(s) IV Intermittent every 24 hours  metoprolol succinate ER 25 milliGRAM(s) Oral every 24 hours  rivaroxaban 20 milliGRAM(s) Oral with dinner    MEDICATIONS  (PRN):  acetaminophen     Tablet .. 650 milliGRAM(s) Oral every 6 hours PRN Temp greater or equal to 38C (100.4F), Mild Pain (1 - 3)  aluminum hydroxide/magnesium hydroxide/simethicone Suspension 30 milliLiter(s) Oral every 4 hours PRN Dyspepsia  dextrose Oral Gel 15 Gram(s) Oral once PRN Blood Glucose LESS THAN 70 milliGRAM(s)/deciliter  melatonin 3 milliGRAM(s) Oral at bedtime PRN Insomnia  ondansetron Injectable 4 milliGRAM(s) IV Push every 8 hours PRN Nausea and/or Vomiting      I&O's Detail    28 Nov 2024 07:01  -  29 Nov 2024 07:00  --------------------------------------------------------  IN:    Oral Fluid: 100 mL  Total IN: 100 mL    OUT:    Incontinent per Condom Catheter (mL): 600 mL    Voided (mL): 100 mL  Total OUT: 700 mL    Total NET: -600 mL          Vital Signs Last 24 Hrs  T(C): 38.2 (29 Nov 2024 23:17), Max: 38.2 (29 Nov 2024 23:17)  T(F): 100.7 (29 Nov 2024 23:17), Max: 100.7 (29 Nov 2024 23:17)  HR: 86 (29 Nov 2024 21:30) (78 - 86)  BP: 110/63 (29 Nov 2024 21:30) (110/63 - 138/74)  BP(mean): 92 (29 Nov 2024 11:00) (92 - 92)  RR: 18 (29 Nov 2024 21:30) (16 - 18)  SpO2: 97% (29 Nov 2024 21:30) (97% - 98%)    Parameters below as of 29 Nov 2024 21:30  Patient On (Oxygen Delivery Method): room air        PHYSICAL EXAM:   Patient declined    LABS:                        8.9    5.72  )-----------( 268      ( 29 Nov 2024 07:24 )             28.4     11-29    140  |  106  |  39[H]  ----------------------------<  259[H]  4.4   |  21[L]  |  0.84    Ca    8.8      29 Nov 2024 07:24  Phos  2.5     11-29  Mg     1.7     11-29    TPro  6.6  /  Alb  3.1[L]  /  TBili  0.2  /  DBili  x   /  AST  7[L]  /  ALT  <5[L]  /  AlkPhos  148[H]  11-29    LIVER FUNCTIONS - ( 29 Nov 2024 07:24 )  Alb: 3.1 g/dL / Pro: 6.6 g/dL / ALK PHOS: 148 U/L / ALT: <5 U/L / AST: 7 U/L / GGT: x           PT/INR - ( 28 Nov 2024 12:13 )   PT: 13.4 sec;   INR: 1.15          PTT - ( 28 Nov 2024 12:13 )  PTT:29.6 sec  CAPILLARY BLOOD GLUCOSE      POCT Blood Glucose.: 221 mg/dL (29 Nov 2024 21:48)  POCT Blood Glucose.: 155 mg/dL (29 Nov 2024 17:46)  POCT Blood Glucose.: 272 mg/dL (29 Nov 2024 12:01)        Urinalysis Basic - ( 29 Nov 2024 07:24 )    Color: x / Appearance: x / SG: x / pH: x  Gluc: 259 mg/dL / Ketone: x  / Bili: x / Urobili: x   Blood: x / Protein: x / Nitrite: x   Leuk Esterase: x / RBC: x / WBC x   Sq Epi: x / Non Sq Epi: x / Bacteria: x        Culture - Blood (collected 28 Nov 2024 13:14)  Source: .Blood BLOOD  Gram Stain (29 Nov 2024 11:05):    Growth in aerobic bottle: Gram Negative Rods    Growth in anaerobic bottle: Gram Negative Rods  Preliminary Report (29 Nov 2024 11:05):    Growth in aerobic bottle: Gram Negative Rods    Growth in anaerobic bottle: Gram Negative Rods    Culture - Blood (collected 28 Nov 2024 13:14)  Source: .Blood BLOOD  Gram Stain (29 Nov 2024 11:52):    Growth in aerobic bottle: Gram Negative Rods    Growth in anaerobic bottle: Gram Negative Rods  Preliminary Report (29 Nov 2024 11:52):    Growth in aerobic bottle: Gram Negative Rods    Growth in anaerobic bottle: Gram Negative Rods    Direct identification is available within approximately 3-5    hours either by Blood Panel Multiplexed PCR or Direct    MALDI-TOF. Details: [email or web address]  Organism: Blood Culture PCR (29 Nov 2024 13:09)  Organism: Blood Culture PCR (29 Nov 2024 13:09)    Urinalysis with Rflx Culture (collected 28 Nov 2024 11:50)           RADIOLOGY & ADDITIONAL STUDIES:  CT Abdomen and Pelvis w/ IV Cont:   ACC: [id number] EXAM:  CT ABDOMEN AND PELVIS IC   ORDERED BY: LIUDMILA VIGIL     PROCEDURE DATE:  11/28/2024          INTERPRETATION:  CLINICAL INFORMATION: Abdominal pain. Sacral ulcer.    COMPARISON: None.    CONTRAST/COMPLICATIONS:  IV Contrast: Isovue 370  90 cc administered   10 cc discarded  Oral Contrast: NONE      PROCEDURE:  CT of the Abdomen and Pelvis was performed.  Sagittal and coronal reformats were performed.    FINDINGS:  LOWER CHEST: Left lower lobe consolidative opacity and rightlower lobe   subsegmental atelectasis.    LIVER: Within normal limits.  BILE DUCTS: Normal caliber.  GALLBLADDER: Distended.  SPLEEN: Within normal limits.  PANCREAS: A portion of the pancreatic tail herniating into the   intrathoracic space via esophageal hiatus. Ill-defined hypoattenuation of   the pancreatic head. The evaluation is limited secondary to motion   artifact.  ADRENALS: Within normal limits.  KIDNEYS/URETERS: Right renal simple cyst. Left renal subcentimeter   hypodense foci, too small to characterize. No hydronephrosis. Symmetric   renal enhancement.    BLADDER: Right posterior lateral bladder diverticulum.  REPRODUCTIVE ORGANS: Enlarged prostate with coarse calcification.    BOWEL: Intrathoracic stomach. In the medial aspect of the second portion   of the duodenum, there are few air pockets, which may represent air   within the duodenal diverticulum or contained perforation. Evaluation   limited without oral contrast. Moderate amount of steatorrhea.  PERITONEUM/RETROPERITONEUM: Within normal limits.  VESSELS: Atherosclerotic changes.  LYMPH NODES: No lymphadenopathy.  ABDOMINAL WALL: Skin thickening and subcutaneous fat stranding/small   collection in the sacrum, consistent with sacral decubitus ulcer.  BONES: No evidence of osteomyelitis. Degenerative changes. Severe   compression deformity of L2. Right femoral ORIF.    IMPRESSION:  1.  Left lower lobe pneumonia.  2.  A portion of the pancreatic tail herniating into the intrathoracic   space via esophageal hiatus. Ill-defined hypoattenuation of the   pancreatic head. The evaluation is limited secondary to motion artifact.   If prior studies are available, comparison can be made to evaluate the   stability of the hypoattenuation of the pancreatic head. Otherwise, if   clinically warranted, abdominal MR can be considered.  3.  In the medial aspect of the second portion of the duodenum, there are   few air pockets, which may represent air within the duodenal diverticulum   or contained perforation. Evaluation limited without oral contrast.  4.  Moderate amount of steatorrhea.  5.  Skin thickening and subcutaneous fat stranding/small collection in   the sacrum, consistent with sacral decubitus ulcer. No CT evidence of   osteomyelitis.      --- End of Report ---            EUGENIO HERNANDEZ MD; Attending Radiologist  This document has been electronically signed. Nov 28 2024  2:54PM (11-28-24 @ 14:37)

## 2024-11-29 NOTE — CONSULT NOTE ADULT - ASSESSMENT
Pt is a 79 M with PMHx of Afib on Xarelto, HTN, Esophageal Cancer (in remission since early ), ?CVA,  Crohns, Chronic colitis, T2DM, recent femur fracture in Oct 2024, who was sent in from [facility] rehab (were he resided since Hip surgery in October) for lethargy/AMS, hypotension and tachycardic to 160s at [facility]. Per chart review, prior to the fall, pt lived alone and walked with walker. While at rehab he has been bed-bound and developed a sacral ulcer about 3 weeks ago and was started on oral abx Keflex on , unclear indication. In the ED pt noted for have T 100.4 and received Zosyn 3.375, vancomycin 1g,       Labs    UA neg   : RVP neg  : Bcx x2 growing Proteus, ESBL/CTX-M resistance marker     Urine Strep/Legionella neg    StapA +, MRSA neg     Imagin/28 CXR: small patchy opacities the largest of which is at the left lung base    CTH No acute pathology    CTAP: Left lower lobe pneumonia. In the medial aspect of the second portion of the duodenum, there are  few air pockets, which may represent air within the duodenal diverticulum or contained perforation. Skin thickening and subcutaneous fat stranding/small collection in the sacrum, consistent with sacral decubitus ulcer. No CT evidence of   osteomyelitis.  -  TTE no vegetations  CXR     #Severe Sepsis   #Sacral ulcer  Pt presented with Severe sepsis s/p Vanc Zosyn in ED, then narrowed to CTX now Bcx growing Proteus/ESBL/CTX-M resistance marker. Pt afebrile, HDS, WBC downtrending 7->5,     - Repeat Bcx   - f/u : Bcx x2 growing Proteus, ESBL/CTX-M resistance marker   -  Pt is a 79 M with PMHx of Afib on Xarelto, HTN, Esophageal Cancer (in remission since early ), ?CVA,  Crohns, Chronic colitis, T2DM, recent femur fracture in Oct 2024, who was sent in from [facility] rehab (were he resided since Hip surgery in October) for lethargy/AMS, hypotension and tachycardic to 160s at [facility]. Per chart review, prior to the fall, pt lived alone and walked with walker. While at rehab he has been bed-bound and developed a sacral ulcer about 3 weeks ago and was started on oral abx Keflex on , unclear indication. In the ED pt noted for have T 100.4 and received Zosyn 3.375, vancomycin 1g, Bcx growing Proteus/ESBL/CTX-M resistance marker ID consulted for Abx recommendations.       Labs    UA neg   : RVP neg  : Bcx x2 growing Proteus, ESBL/CTX-M resistance marker     Urine Strep/Legionella neg    StapA +, MRSA neg     Imagin/28 CXR: small patchy opacities the largest of which is at the left lung base    CTH No acute pathology    CTAP: Left lower lobe pneumonia. In the medial aspect of the second portion of the duodenum, there are  few air pockets, which may represent air within the duodenal diverticulum or contained perforation. Skin thickening and subcutaneous fat stranding/small collection in the sacrum, consistent with sacral decubitus ulcer. No CT evidence of   osteomyelitis.  -  TTE no vegetations  CXR     #Severe Sepsis   #PNA  #Sacral ulcer  Pt presented with Severe sepsis s/p Vanc Zosyn in ED, then narrowed to CTX now Bcx growing Proteus/ESBL/CTX-M resistance marker. Pt afebrile, HDS, WBC downtrending 7->5,     - Repeat Bcx   - f/u : Bcx x2 growing Proteus, ESBL/CTX-M resistance marker   -  Pt is a 79 M with PMHx of Afib on Xarelto, HTN, Esophageal Cancer (in remission since early ), ?CVA,  Crohns, Chronic colitis, T2DM, recent femur fracture in Oct 2024, who was sent in from [facility] rehab (were he resided since Hip surgery in October) for lethargy/AMS, hypotension and tachycardic to 160s at [facility]. Per chart review, prior to the fall, pt lived alone and walked with walker. While at rehab he has been bed-bound and developed a sacral ulcer about 3 weeks ago and was started on oral abx Keflex on , unclear indication. In the ED pt noted for have T 100.4 and received Zosyn 3.375, vancomycin 1g, Bcx growing Proteus/ESBL/CTX-M resistance marker ID consulted for Abx recommendations.       Labs    UA neg   : RVP neg  : Bcx x2 growing Proteus, ESBL/CTX-M resistance marker     Urine Strep/Legionella neg    StapA +, MRSA neg     Imagin/28 CXR: small patchy opacities the largest of which is at the left lung base    CTH No acute pathology    CTAP: Left lower lobe pneumonia. In the medial aspect of the second portion of the duodenum, there are  few air pockets, which may represent air within the duodenal diverticulum or contained perforation. Skin thickening and subcutaneous fat stranding/small collection in the sacrum, consistent with sacral decubitus ulcer. No CT evidence of   osteomyelitis.  -  TTE no vegetations  CXR     #Severe Sepsis   #PNA  #Sacral ulcer  Pt presented with Severe sepsis s/p Vanc Zosyn in ED, then narrowed to CTX now Bcx growing Proteus/ESBL/CTX-M resistance marker. Pt afebrile, HDS, WBC downtrending 7->5. Recommend starting Ertapenem and f/u culture specification and sensitives. If cx grows PsA, will need to adjust Abx.       - Repeat Bcx   - f/u : Bcx x2 growing Proteus, ESBL/CTX-M resistance marker   - Start Ertapenem 1g q24h   - dc Vanc/Zosyn     ID Team 1 will follow

## 2024-11-29 NOTE — DIETITIAN INITIAL EVALUATION ADULT - OTHER INFO
Per H&P: 79 M hx of Afib on Xarelto, HTN, Esophageal Cancer (in remission since early 2000s), ?CVA,  Crohns, Chronic colitis, T2DM, recent femur fracture in Oct 2024 was sent in from [facility] rehab for for lethargy/AMS and hypotension, tachycardic to 160s at [facility]  noticed this morning. As per report, patient was fine yesterday evening.  BP improved after 200 cc IVF en route.   As per daughter at bedside, she last saw patient 5 days ago and he seemed to be a bit more tired and confused then baseline. At baseline, he is  AOx3 however typically asks questions multiple times or gets confused on conversation. The daughter felt like he was "not himself" when she last saw him. As per daughter, patient had a fall in Oct 2024, was found to have femur fracture and had surgery for this, he was then sent to [facility] where he had been until coming to ED. She reports before the fall, he lived alone and walked with walker. While at rehab he has been bed-bound and developed a sacral ulcer about 3 weeks ago. Of note patient was started on oral abx Keflex on 11/27, unclear indication     Met with pt this AM at bedside. Pt not responding to questions. RD able to obtain diet order from [facility] rehab - pt was on Consistent Carbohydrate, Low Na, mechanically soft diet. Pt with hx of T2DM - A1C 7.5% (11/29). Pt on appetite stimulant (remeron). Nutrition focused physical exam revealed severe muscle and subcutaneous fat wasting. See RD recommendations below.

## 2024-11-29 NOTE — SWALLOW BEDSIDE ASSESSMENT ADULT - COMMENTS
Pt with PMH of afib on Xarelto, HTN, esophageal neoplasm (? s/p distal esophagectomy), Crohns, T2DM, sacral ulcer, here for lethargy/AMS and hypotension in NH noticed this morning found to have severe sepsis, likely i/s/o pneumonia vs sacral ulcer.

## 2024-11-29 NOTE — OCCUPATIONAL THERAPY INITIAL EVALUATION ADULT - MODIFIED CLINICAL TEST OF SENSORY INTEGRATION IN BALANCE TEST
Pt sat EOB ~8 minutes with constant maxA for postural control, further mobility deferred 2/2 pt mildly agitated and c/o of pain

## 2024-11-29 NOTE — DIETITIAN INITIAL EVALUATION ADULT - PROBLEM SELECTOR PLAN 1
Patient presented with severe sepsis: Fever 100.4, , and elevated Lactate as well as lethargy. Source likely  PNA given L lower lobe opacity  vs Sacral ulcer   CT showed: Left lower lobe pneumonia. Skin thickening and subcutaneous fat stranding/small collection in the sacrum, consistent with sacral decubitus ulcer. No CT evidence of osteomyelitis.  Currently satting well in RA. Sacral Ulcer noted with foul smell purulent discharge   s/p vancomycin and zosyn and 2L of NS in the ED    Plan:   -c/w vancomycin   -increase Zosyn to 4.5 q8hr for PSA coverage given DM   -75cc/hr of LR  for 10 hours   -f/u Blood cultures  -collect sputum culture if producing   -wound care  -surgery recs regarding possible drain of sacral ulcer collection  -PT/OT consult , nutrition consult

## 2024-11-29 NOTE — ADVANCED PRACTICE NURSE CONSULT - REASON FOR CONSULT
sacral wound, present on admission    Per chart review, 79 M hx of afib on xarelto, htn, esophageal neoplasm, crohns, T2DM, sacral ulcer, here for lethargy/AMS and hypotension in NH noticed this morning found to have sepsis, likely i/s/o pneumonia vs sacral ulcer.

## 2024-11-29 NOTE — PROGRESS NOTE ADULT - SUBJECTIVE AND OBJECTIVE BOX
Patient is a 79y old  Male who presents with a chief complaint of Severe sepsis (28 Nov 2024 22:23)      HOSPITAL COURSE:     OVERNIGHT EVENTS:    SUBJECTIVE:     ROS: otherwise negative      T(C): 36.4 (11-28-24 @ 20:56), Max: 38 (11-28-24 @ 11:45)  HR: 93 (11-28-24 @ 20:56) (76 - 166)  BP: 120/67 (11-28-24 @ 20:56) (91/65 - 139/76)  RR: 16 (11-28-24 @ 20:56) (16 - 20)  SpO2: 98% (11-28-24 @ 20:56) (95% - 100%)  Wt(kg): --Vital Signs Last 24 Hrs  T(C): 36.4 (28 Nov 2024 20:56), Max: 38 (28 Nov 2024 11:45)  T(F): 97.5 (28 Nov 2024 20:56), Max: 100.4 (28 Nov 2024 11:45)  HR: 93 (28 Nov 2024 20:56) (76 - 166)  BP: 120/67 (28 Nov 2024 20:56) (91/65 - 139/76)  BP(mean): --  RR: 16 (28 Nov 2024 20:56) (16 - 20)  SpO2: 98% (28 Nov 2024 20:56) (95% - 100%)    Parameters below as of 28 Nov 2024 20:56  Patient On (Oxygen Delivery Method): room air        PHYSICAL EXAM:  Constitutional: resting comfortably in bed; NAD  Head: NC/AT  Eyes: PERRL, EOMI, anicteric sclera  ENT: no nasal discharge; MMM  Neck: supple; no JVD or thyromegaly  Respiratory: CTA B/L; no W/R/R, no retractions  Cardiac: +S1/S2; RRR; no M/R/G  Gastrointestinal: soft, NT/ND; no rebound or guarding; +BSx4  Back: spine midline, no bony tenderness or step-offs; no CVAT B/L  Extremities: WWP, no clubbing or cyanosis; no peripheral edema. Capillary refill <2 sec  Musculoskeletal: NROM x4; no joint swelling, tenderness or erythema  Vascular: 2+ radial, DP/PT pulses B/L  Dermatologic: skin warm, dry and intact; no rashes, wounds, or scars  Lymphatic: no submandibular or cervical LAD  Neurologic: AAOx3; CNII-XII grossly intact; no focal deficits  Psychiatric: affect and characteristics of appearance, verbalizations, behaviors are appropriate    LABS:                        10.3   7.42  )-----------( 288      ( 28 Nov 2024 12:13 )             33.0     11-28    139  |  102  |  56[H]  ----------------------------<  247[H]  4.7   |  20[L]  |  0.99    Ca    9.2      28 Nov 2024 12:13    TPro  7.5  /  Alb  3.5  /  TBili  0.3  /  DBili  x   /  AST  9[L]  /  ALT  <5[L]  /  AlkPhos  176[H]  11-28      PT/INR - ( 28 Nov 2024 12:13 )   PT: 13.4 sec;   INR: 1.15          PTT - ( 28 Nov 2024 12:13 )  PTT:29.6 sec  Urinalysis Basic - ( 28 Nov 2024 12:13 )    Color: x / Appearance: x / SG: x / pH: x  Gluc: 247 mg/dL / Ketone: x  / Bili: x / Urobili: x   Blood: x / Protein: x / Nitrite: x   Leuk Esterase: x / RBC: x / WBC x   Sq Epi: x / Non Sq Epi: x / Bacteria: x      CAPILLARY BLOOD GLUCOSE      POCT Blood Glucose.: 236 mg/dL (28 Nov 2024 22:24)        Urinalysis Basic - ( 28 Nov 2024 12:13 )    Color: x / Appearance: x / SG: x / pH: x  Gluc: 247 mg/dL / Ketone: x  / Bili: x / Urobili: x   Blood: x / Protein: x / Nitrite: x   Leuk Esterase: x / RBC: x / WBC x   Sq Epi: x / Non Sq Epi: x / Bacteria: x        MEDICATIONS  (STANDING):  cholecalciferol 1000 Unit(s) Oral daily  dextrose 5%. 1000 milliLiter(s) (50 mL/Hr) IV Continuous <Continuous>  dextrose 5%. 1000 milliLiter(s) (100 mL/Hr) IV Continuous <Continuous>  dextrose 50% Injectable 25 Gram(s) IV Push once  dextrose 50% Injectable 12.5 Gram(s) IV Push once  dextrose 50% Injectable 25 Gram(s) IV Push once  glucagon  Injectable 1 milliGRAM(s) IntraMuscular once  insulin lispro (ADMELOG) corrective regimen sliding scale   SubCutaneous three times a day before meals  insulin lispro (ADMELOG) corrective regimen sliding scale   SubCutaneous at bedtime  lactated ringers. 1000 milliLiter(s) (75 mL/Hr) IV Continuous <Continuous>  metoprolol succinate ER 25 milliGRAM(s) Oral every 24 hours  pantoprazole    Tablet 40 milliGRAM(s) Oral before breakfast  piperacillin/tazobactam IVPB.. 4.5 Gram(s) IV Intermittent every 8 hours  rivaroxaban 20 milliGRAM(s) Oral with dinner  vancomycin  IVPB 1250 milliGRAM(s) IV Intermittent every 12 hours    MEDICATIONS  (PRN):  acetaminophen     Tablet .. 650 milliGRAM(s) Oral every 6 hours PRN Temp greater or equal to 38C (100.4F), Mild Pain (1 - 3)  aluminum hydroxide/magnesium hydroxide/simethicone Suspension 30 milliLiter(s) Oral every 4 hours PRN Dyspepsia  dextrose Oral Gel 15 Gram(s) Oral once PRN Blood Glucose LESS THAN 70 milliGRAM(s)/deciliter  melatonin 3 milliGRAM(s) Oral at bedtime PRN Insomnia  ondansetron Injectable 4 milliGRAM(s) IV Push every 8 hours PRN Nausea and/or Vomiting      RADIOLOGY & ADDITIONAL TESTS: Reviewed   Patient is a 79y old  Male who presents with a chief complaint of Severe sepsis (28 Nov 2024 22:23)      HOSPITAL COURSE:     OVERNIGHT EVENTS: no acute surgical intervention per surg. passed bedside dysphagia screen.    SUBJECTIVE: seen at bedside this morning. he was lethargic/obtunded on examination this morning and unable to participate in interview but was arousable to loud verbal stimuli and able to open eyes and squeeze hand    ROS: otherwise negative      T(C): 36.4 (11-28-24 @ 20:56), Max: 38 (11-28-24 @ 11:45)  HR: 93 (11-28-24 @ 20:56) (76 - 166)  BP: 120/67 (11-28-24 @ 20:56) (91/65 - 139/76)  RR: 16 (11-28-24 @ 20:56) (16 - 20)  SpO2: 98% (11-28-24 @ 20:56) (95% - 100%)  Wt(kg): --Vital Signs Last 24 Hrs  T(C): 36.4 (28 Nov 2024 20:56), Max: 38 (28 Nov 2024 11:45)  T(F): 97.5 (28 Nov 2024 20:56), Max: 100.4 (28 Nov 2024 11:45)  HR: 93 (28 Nov 2024 20:56) (76 - 166)  BP: 120/67 (28 Nov 2024 20:56) (91/65 - 139/76)  BP(mean): --  RR: 16 (28 Nov 2024 20:56) (16 - 20)  SpO2: 98% (28 Nov 2024 20:56) (95% - 100%)    Parameters below as of 28 Nov 2024 20:56  Patient On (Oxygen Delivery Method): room air        PHYSICAL EXAM:  Constitutional: resting comfortably in bed; NAD  Head: NC/AT  Eyes: PERRL, EOMI, anicteric sclera  ENT: no nasal discharge; MMM  Neck: supple; no JVD or thyromegaly  Respiratory: CTA B/L; no W/R/R, no retractions  Cardiac: grade 3 systolic murmur most prominent over LSB  Gastrointestinal: diffuse TTP all over abd (he doesn't talk but whinces in pain), but soft and non distended  Back: spine midline, no bony tenderness or step-offs; no CVAT B/L  Extremities: WWP, no clubbing or cyanosis; no peripheral edema. Capillary refill <2 sec  Musculoskeletal: NROM x4; no joint swelling, tenderness or erythema  Vascular: 2+ radial, DP/PT pulses B/L  Dermatologic: skin warm, dry and intact; no rashes, wounds, or scars  Lymphatic: no submandibular or cervical LAD  Neurologic: obtunded but arousable to loud verbal stimuli and can open eyes and squeeze hand  LABS:                        10.3   7.42  )-----------( 288      ( 28 Nov 2024 12:13 )             33.0     11-28    139  |  102  |  56[H]  ----------------------------<  247[H]  4.7   |  20[L]  |  0.99    Ca    9.2      28 Nov 2024 12:13    TPro  7.5  /  Alb  3.5  /  TBili  0.3  /  DBili  x   /  AST  9[L]  /  ALT  <5[L]  /  AlkPhos  176[H]  11-28      PT/INR - ( 28 Nov 2024 12:13 )   PT: 13.4 sec;   INR: 1.15          PTT - ( 28 Nov 2024 12:13 )  PTT:29.6 sec  Urinalysis Basic - ( 28 Nov 2024 12:13 )    Color: x / Appearance: x / SG: x / pH: x  Gluc: 247 mg/dL / Ketone: x  / Bili: x / Urobili: x   Blood: x / Protein: x / Nitrite: x   Leuk Esterase: x / RBC: x / WBC x   Sq Epi: x / Non Sq Epi: x / Bacteria: x      CAPILLARY BLOOD GLUCOSE      POCT Blood Glucose.: 236 mg/dL (28 Nov 2024 22:24)        Urinalysis Basic - ( 28 Nov 2024 12:13 )    Color: x / Appearance: x / SG: x / pH: x  Gluc: 247 mg/dL / Ketone: x  / Bili: x / Urobili: x   Blood: x / Protein: x / Nitrite: x   Leuk Esterase: x / RBC: x / WBC x   Sq Epi: x / Non Sq Epi: x / Bacteria: x        MEDICATIONS  (STANDING):  cholecalciferol 1000 Unit(s) Oral daily  dextrose 5%. 1000 milliLiter(s) (50 mL/Hr) IV Continuous <Continuous>  dextrose 5%. 1000 milliLiter(s) (100 mL/Hr) IV Continuous <Continuous>  dextrose 50% Injectable 25 Gram(s) IV Push once  dextrose 50% Injectable 12.5 Gram(s) IV Push once  dextrose 50% Injectable 25 Gram(s) IV Push once  glucagon  Injectable 1 milliGRAM(s) IntraMuscular once  insulin lispro (ADMELOG) corrective regimen sliding scale   SubCutaneous three times a day before meals  insulin lispro (ADMELOG) corrective regimen sliding scale   SubCutaneous at bedtime  lactated ringers. 1000 milliLiter(s) (75 mL/Hr) IV Continuous <Continuous>  metoprolol succinate ER 25 milliGRAM(s) Oral every 24 hours  pantoprazole    Tablet 40 milliGRAM(s) Oral before breakfast  piperacillin/tazobactam IVPB.. 4.5 Gram(s) IV Intermittent every 8 hours  rivaroxaban 20 milliGRAM(s) Oral with dinner  vancomycin  IVPB 1250 milliGRAM(s) IV Intermittent every 12 hours    MEDICATIONS  (PRN):  acetaminophen     Tablet .. 650 milliGRAM(s) Oral every 6 hours PRN Temp greater or equal to 38C (100.4F), Mild Pain (1 - 3)  aluminum hydroxide/magnesium hydroxide/simethicone Suspension 30 milliLiter(s) Oral every 4 hours PRN Dyspepsia  dextrose Oral Gel 15 Gram(s) Oral once PRN Blood Glucose LESS THAN 70 milliGRAM(s)/deciliter  melatonin 3 milliGRAM(s) Oral at bedtime PRN Insomnia  ondansetron Injectable 4 milliGRAM(s) IV Push every 8 hours PRN Nausea and/or Vomiting      RADIOLOGY & ADDITIONAL TESTS: Reviewed   Patient is a 79y old  Male who presents with a chief complaint of Severe sepsis (28 Nov 2024 22:23)    HOSPITAL COURSE:     OVERNIGHT EVENTS: no acute surgical intervention per surg. passed bedside dysphagia screen.    SUBJECTIVE: seen at bedside this morning. he was lethargic/obtunded on examination this morning and unable to participate in interview but was arousable to loud verbal stimuli and able to open eyes and squeeze hand    ROS: otherwise negative      T(C): 36.4 (11-28-24 @ 20:56), Max: 38 (11-28-24 @ 11:45)  HR: 93 (11-28-24 @ 20:56) (76 - 166)  BP: 120/67 (11-28-24 @ 20:56) (91/65 - 139/76)  RR: 16 (11-28-24 @ 20:56) (16 - 20)  SpO2: 98% (11-28-24 @ 20:56) (95% - 100%)  Wt(kg): --Vital Signs Last 24 Hrs  T(C): 36.4 (28 Nov 2024 20:56), Max: 38 (28 Nov 2024 11:45)  T(F): 97.5 (28 Nov 2024 20:56), Max: 100.4 (28 Nov 2024 11:45)  HR: 93 (28 Nov 2024 20:56) (76 - 166)  BP: 120/67 (28 Nov 2024 20:56) (91/65 - 139/76)  BP(mean): --  RR: 16 (28 Nov 2024 20:56) (16 - 20)  SpO2: 98% (28 Nov 2024 20:56) (95% - 100%)    Parameters below as of 28 Nov 2024 20:56  Patient On (Oxygen Delivery Method): room air        PHYSICAL EXAM:  Constitutional: resting comfortably in bed; NAD  Head: NC/AT  Eyes: PERRL, EOMI, anicteric sclera  ENT: no nasal discharge; MMM  Neck: supple; no JVD or thyromegaly  Respiratory: CTA B/L; no W/R/R, no retractions  Cardiac: grade 3 systolic murmur most prominent over LSB  Gastrointestinal: diffuse TTP all over abd (he doesn't talk but whinces in pain), but soft and non distended  Back: spine midline, no bony tenderness or step-offs; no CVAT B/L  Extremities: WWP, no clubbing or cyanosis; no peripheral edema. Capillary refill <2 sec  Musculoskeletal: NROM x4; no joint swelling, tenderness or erythema  Vascular: 2+ radial, DP/PT pulses B/L  Dermatologic: skin warm, dry and intact; no rashes, wounds, or scars  Lymphatic: no submandibular or cervical LAD  Neurologic: obtunded but arousable to loud verbal stimuli and can open eyes and squeeze hand  LABS:                        10.3   7.42  )-----------( 288      ( 28 Nov 2024 12:13 )             33.0     11-28    139  |  102  |  56[H]  ----------------------------<  247[H]  4.7   |  20[L]  |  0.99    Ca    9.2      28 Nov 2024 12:13    TPro  7.5  /  Alb  3.5  /  TBili  0.3  /  DBili  x   /  AST  9[L]  /  ALT  <5[L]  /  AlkPhos  176[H]  11-28      PT/INR - ( 28 Nov 2024 12:13 )   PT: 13.4 sec;   INR: 1.15          PTT - ( 28 Nov 2024 12:13 )  PTT:29.6 sec  Urinalysis Basic - ( 28 Nov 2024 12:13 )    Color: x / Appearance: x / SG: x / pH: x  Gluc: 247 mg/dL / Ketone: x  / Bili: x / Urobili: x   Blood: x / Protein: x / Nitrite: x   Leuk Esterase: x / RBC: x / WBC x   Sq Epi: x / Non Sq Epi: x / Bacteria: x      CAPILLARY BLOOD GLUCOSE      POCT Blood Glucose.: 236 mg/dL (28 Nov 2024 22:24)        Urinalysis Basic - ( 28 Nov 2024 12:13 )    Color: x / Appearance: x / SG: x / pH: x  Gluc: 247 mg/dL / Ketone: x  / Bili: x / Urobili: x   Blood: x / Protein: x / Nitrite: x   Leuk Esterase: x / RBC: x / WBC x   Sq Epi: x / Non Sq Epi: x / Bacteria: x        MEDICATIONS  (STANDING):  cholecalciferol 1000 Unit(s) Oral daily  dextrose 5%. 1000 milliLiter(s) (50 mL/Hr) IV Continuous <Continuous>  dextrose 5%. 1000 milliLiter(s) (100 mL/Hr) IV Continuous <Continuous>  dextrose 50% Injectable 25 Gram(s) IV Push once  dextrose 50% Injectable 12.5 Gram(s) IV Push once  dextrose 50% Injectable 25 Gram(s) IV Push once  glucagon  Injectable 1 milliGRAM(s) IntraMuscular once  insulin lispro (ADMELOG) corrective regimen sliding scale   SubCutaneous three times a day before meals  insulin lispro (ADMELOG) corrective regimen sliding scale   SubCutaneous at bedtime  lactated ringers. 1000 milliLiter(s) (75 mL/Hr) IV Continuous <Continuous>  metoprolol succinate ER 25 milliGRAM(s) Oral every 24 hours  pantoprazole    Tablet 40 milliGRAM(s) Oral before breakfast  piperacillin/tazobactam IVPB.. 4.5 Gram(s) IV Intermittent every 8 hours  rivaroxaban 20 milliGRAM(s) Oral with dinner  vancomycin  IVPB 1250 milliGRAM(s) IV Intermittent every 12 hours    MEDICATIONS  (PRN):  acetaminophen     Tablet .. 650 milliGRAM(s) Oral every 6 hours PRN Temp greater or equal to 38C (100.4F), Mild Pain (1 - 3)  aluminum hydroxide/magnesium hydroxide/simethicone Suspension 30 milliLiter(s) Oral every 4 hours PRN Dyspepsia  dextrose Oral Gel 15 Gram(s) Oral once PRN Blood Glucose LESS THAN 70 milliGRAM(s)/deciliter  melatonin 3 milliGRAM(s) Oral at bedtime PRN Insomnia  ondansetron Injectable 4 milliGRAM(s) IV Push every 8 hours PRN Nausea and/or Vomiting      RADIOLOGY & ADDITIONAL TESTS: Reviewed   Patient is a 79y old  Male who presents with a chief complaint of Severe sepsis (28 Nov 2024 22:23)    HOSPITAL COURSE:     OVERNIGHT EVENTS: no acute surgical intervention per surg. passed bedside dysphagia screen.    SUBJECTIVE: seen at bedside this morning. he was lethargic on examination this morning and unable to participate in interview but was arousable to loud verbal stimuli and able to open eyes and squeeze hand    ROS: otherwise negative      T(C): 36.4 (11-28-24 @ 20:56), Max: 38 (11-28-24 @ 11:45)  HR: 93 (11-28-24 @ 20:56) (76 - 166)  BP: 120/67 (11-28-24 @ 20:56) (91/65 - 139/76)  RR: 16 (11-28-24 @ 20:56) (16 - 20)  SpO2: 98% (11-28-24 @ 20:56) (95% - 100%)  Wt(kg): --Vital Signs Last 24 Hrs  T(C): 36.4 (28 Nov 2024 20:56), Max: 38 (28 Nov 2024 11:45)  T(F): 97.5 (28 Nov 2024 20:56), Max: 100.4 (28 Nov 2024 11:45)  HR: 93 (28 Nov 2024 20:56) (76 - 166)  BP: 120/67 (28 Nov 2024 20:56) (91/65 - 139/76)  BP(mean): --  RR: 16 (28 Nov 2024 20:56) (16 - 20)  SpO2: 98% (28 Nov 2024 20:56) (95% - 100%)    Parameters below as of 28 Nov 2024 20:56  Patient On (Oxygen Delivery Method): room air        PHYSICAL EXAM:  Constitutional: resting comfortably in bed; NAD  Head: NC/AT  Eyes: PERRL, EOMI, anicteric sclera  ENT: no nasal discharge; MMM  Neck: supple; no JVD or thyromegaly  Respiratory: CTA B/L; no W/R/R, no retractions  Cardiac: grade 3 systolic murmur most prominent over LSB  Gastrointestinal: diffuse TTP all over abd (he doesn't talk but whinces in pain), but soft and non distended  Back: spine midline, no bony tenderness or step-offs; no CVAT B/L  Extremities: WWP, no clubbing or cyanosis; no peripheral edema. Capillary refill <2 sec  Musculoskeletal: NROM x4; no joint swelling, tenderness or erythema  Vascular: 2+ radial, DP/PT pulses B/L  Dermatologic: skin warm, dry and intact; no rashes, wounds, or scars  Lymphatic: no submandibular or cervical LAD  Neurologic: lethargic but arousable to loud verbal stimuli and can open eyes and squeeze hand  LABS:                        10.3   7.42  )-----------( 288      ( 28 Nov 2024 12:13 )             33.0     11-28    139  |  102  |  56[H]  ----------------------------<  247[H]  4.7   |  20[L]  |  0.99    Ca    9.2      28 Nov 2024 12:13    TPro  7.5  /  Alb  3.5  /  TBili  0.3  /  DBili  x   /  AST  9[L]  /  ALT  <5[L]  /  AlkPhos  176[H]  11-28      PT/INR - ( 28 Nov 2024 12:13 )   PT: 13.4 sec;   INR: 1.15          PTT - ( 28 Nov 2024 12:13 )  PTT:29.6 sec  Urinalysis Basic - ( 28 Nov 2024 12:13 )    Color: x / Appearance: x / SG: x / pH: x  Gluc: 247 mg/dL / Ketone: x  / Bili: x / Urobili: x   Blood: x / Protein: x / Nitrite: x   Leuk Esterase: x / RBC: x / WBC x   Sq Epi: x / Non Sq Epi: x / Bacteria: x      CAPILLARY BLOOD GLUCOSE      POCT Blood Glucose.: 236 mg/dL (28 Nov 2024 22:24)        Urinalysis Basic - ( 28 Nov 2024 12:13 )    Color: x / Appearance: x / SG: x / pH: x  Gluc: 247 mg/dL / Ketone: x  / Bili: x / Urobili: x   Blood: x / Protein: x / Nitrite: x   Leuk Esterase: x / RBC: x / WBC x   Sq Epi: x / Non Sq Epi: x / Bacteria: x        MEDICATIONS  (STANDING):  cholecalciferol 1000 Unit(s) Oral daily  dextrose 5%. 1000 milliLiter(s) (50 mL/Hr) IV Continuous <Continuous>  dextrose 5%. 1000 milliLiter(s) (100 mL/Hr) IV Continuous <Continuous>  dextrose 50% Injectable 25 Gram(s) IV Push once  dextrose 50% Injectable 12.5 Gram(s) IV Push once  dextrose 50% Injectable 25 Gram(s) IV Push once  glucagon  Injectable 1 milliGRAM(s) IntraMuscular once  insulin lispro (ADMELOG) corrective regimen sliding scale   SubCutaneous three times a day before meals  insulin lispro (ADMELOG) corrective regimen sliding scale   SubCutaneous at bedtime  lactated ringers. 1000 milliLiter(s) (75 mL/Hr) IV Continuous <Continuous>  metoprolol succinate ER 25 milliGRAM(s) Oral every 24 hours  pantoprazole    Tablet 40 milliGRAM(s) Oral before breakfast  piperacillin/tazobactam IVPB.. 4.5 Gram(s) IV Intermittent every 8 hours  rivaroxaban 20 milliGRAM(s) Oral with dinner  vancomycin  IVPB 1250 milliGRAM(s) IV Intermittent every 12 hours    MEDICATIONS  (PRN):  acetaminophen     Tablet .. 650 milliGRAM(s) Oral every 6 hours PRN Temp greater or equal to 38C (100.4F), Mild Pain (1 - 3)  aluminum hydroxide/magnesium hydroxide/simethicone Suspension 30 milliLiter(s) Oral every 4 hours PRN Dyspepsia  dextrose Oral Gel 15 Gram(s) Oral once PRN Blood Glucose LESS THAN 70 milliGRAM(s)/deciliter  melatonin 3 milliGRAM(s) Oral at bedtime PRN Insomnia  ondansetron Injectable 4 milliGRAM(s) IV Push every 8 hours PRN Nausea and/or Vomiting      RADIOLOGY & ADDITIONAL TESTS: Reviewed

## 2024-11-29 NOTE — DIETITIAN INITIAL EVALUATION ADULT - ADD RECOMMEND
1. Once medically able to resume diet: Recommend Consistent Carbohydrate, low Na  - Diet texture per SLP/team  - RD will continue to monitor POCT BG  - Insulin and fluid needs per team  - Honor food preferences, as medically able  2. Recommend Ensure Max BID  - Provides 350 kcal, 20 g pro per serving  3. Recommend micronutrients for wound healing:  - MVI for general nutrient coverage  - Ascorbic acid 500 mg BID  - Zinc sulfate 220 mg/d x 10 days total  4. Recommend thiamin 100 mg/d in setting of malnutrition  5. Monitor lytes, specifically Mg and Phos for refeeding, replete prn   6. Appreciate weekly weight trends  7. Continue with appetite stimulant   8. Continue to monitor BMs to ensure consistent BMs every 1-2 days

## 2024-11-29 NOTE — DIETITIAN INITIAL EVALUATION ADULT - NUTRITION DIAGNOSIS
OFFICE NOTE    Reji Miller is a 60 yes...

## 2024-11-29 NOTE — PHYSICAL THERAPY INITIAL EVALUATION ADULT - ADDITIONAL COMMENTS
Unable to assess as pt with impaired cognition and limited ability answering questions. Pt received from nursing home.

## 2024-11-29 NOTE — DIETITIAN INITIAL EVALUATION ADULT - PERTINENT MEDS FT
MEDICATIONS  (STANDING):  cholecalciferol 1000 Unit(s) Oral daily  collagenase Ointment 1 Application(s) Topical daily  dextrose 5%. 1000 milliLiter(s) (50 mL/Hr) IV Continuous <Continuous>  dextrose 5%. 1000 milliLiter(s) (100 mL/Hr) IV Continuous <Continuous>  dextrose 50% Injectable 25 Gram(s) IV Push once  dextrose 50% Injectable 12.5 Gram(s) IV Push once  dextrose 50% Injectable 25 Gram(s) IV Push once  glucagon  Injectable 1 milliGRAM(s) IntraMuscular once  insulin lispro (ADMELOG) corrective regimen sliding scale   SubCutaneous three times a day before meals  insulin lispro (ADMELOG) corrective regimen sliding scale   SubCutaneous at bedtime  lactated ringers. 1000 milliLiter(s) (75 mL/Hr) IV Continuous <Continuous>  magnesium sulfate  IVPB 2 Gram(s) IV Intermittent once  metoprolol succinate ER 25 milliGRAM(s) Oral every 24 hours  pantoprazole    Tablet 40 milliGRAM(s) Oral before breakfast  piperacillin/tazobactam IVPB.. 4.5 Gram(s) IV Intermittent every 8 hours  rivaroxaban 20 milliGRAM(s) Oral with dinner  vancomycin  IVPB 1250 milliGRAM(s) IV Intermittent every 12 hours    MEDICATIONS  (PRN):  acetaminophen     Tablet .. 650 milliGRAM(s) Oral every 6 hours PRN Temp greater or equal to 38C (100.4F), Mild Pain (1 - 3)  aluminum hydroxide/magnesium hydroxide/simethicone Suspension 30 milliLiter(s) Oral every 4 hours PRN Dyspepsia  dextrose Oral Gel 15 Gram(s) Oral once PRN Blood Glucose LESS THAN 70 milliGRAM(s)/deciliter  melatonin 3 milliGRAM(s) Oral at bedtime PRN Insomnia  ondansetron Injectable 4 milliGRAM(s) IV Push every 8 hours PRN Nausea and/or Vomiting

## 2024-11-29 NOTE — SWALLOW BEDSIDE ASSESSMENT ADULT - SWALLOW EVAL: RECOMMENDED FEEDING/EATING TECHNIQUES
FEED PATIENT ONLY WHEN AWAKE/ALERT AND ACCEPTION OF PO TRIALS/check mouth frequently for oral residue/pocketing/crush medication (when feasible)/oral hygiene

## 2024-11-29 NOTE — DIETITIAN INITIAL EVALUATION ADULT - OTHER CALCULATIONS
*Using current body weight as pt is within % ideal body weight. Needs adjusted for malnutrition, pressure injury, older age, cancer, sepsis. Fluid needs per team. ideal body weight: 166 pounds; % ideal body weight: 84%

## 2024-11-29 NOTE — DIETITIAN INITIAL EVALUATION ADULT - PROBLEM SELECTOR PLAN 8
CT showed: Ill-defined hypoattenuation of the pancreatic head.     Plan:   Consider MRI abdomen as outpatient

## 2024-11-29 NOTE — PROGRESS NOTE ADULT - ASSESSMENT
78yo Male pt with PMH of Afib (on Xarelto), HTN, T2DM, Chron's Disease, dementia, depression, esophageal carcinoma (remission since early 2000s), significant hearing loss, and femur fracture (10/2024); and PSH of distal esopahgectomy?. Patient was brought to ED from [facility] for lethargy, altered mental status, tachycardic and hypotension during the morning. General surgery consulted for imaging findings of pelvic collection related to sacral decub ulcer and possible contained duodenal diverticulum perf.  On exam, abdomen soft, NT, ND, no guarding, no rigidity with upper midline incision, well healed. Sacrum with sacral decubitus ulcer 5x6cm with minimal necrotic tissue, inferior eschar, and surrounding erythema; with no palpable fluctuance. CTAP showing In the medial aspect of the second portion of the duodenum, there are few air pockets, which may represent air within the duodenal diverticulum or contained perforation. Skin thickening and subcutaneous fat stranding/small collection in the sacrum, consistent with sacral decubitus ulcer. Patient admitted to medicine service for sepsis, and found to have Left Lower Lobe pneumonia and UTI. Surgery consulted for possible duodenal diverticulum contained perforation and sacral decubitus ulcer with possible collection, but given benign abdominal exam, no WBC, very low suspicion for contained duodenal perforation and most likely air within diverticulum. And  Sacral decubitus ulcer without underlying fluctuance to suggest abscess. Sepsis etiology more likely from pneumonia or UTI.     Recommendations:    No acute surgical intervention at this time  Agree with IV ATBx  Wound care consult for local wound care  Frequent turning  Wet to dry dressing changes      Team 4c will continue to follow  Plan discussed with attending and chief resident.   ____________________________________________________  Shanthi Bales - Resident   Surgery

## 2024-11-29 NOTE — DIETITIAN INITIAL EVALUATION ADULT - PROBLEM SELECTOR PLAN 7
On admission: BUN 56, creatinine 0.99 with granular cast, theresa i/s/o sepsis     Plan:   -Monitor BMP s/p fluids  -Strict I/O

## 2024-11-29 NOTE — DIETITIAN INITIAL EVALUATION ADULT - PROBLEM SELECTOR PLAN 5
CT showed: A portion of the pancreatic tail herniating into the intrathoracic  space via esophageal hiatus as well as intrathoracic stomach.   Home meds:   Omeprazole 40 qd    Plan:   -c/w pantoprazole 40 (interchange)   -surgery recs regarding plans for intervention

## 2024-11-29 NOTE — ADVANCED PRACTICE NURSE CONSULT - ASSESSMENT
2 person assist to turn, awake but not responding to questions. Condom catheter in place.   Seen by PT and RD  Seen by surgery, no surgical plans.   WBC 5.72    Sacrum, unstageable pressure injury: 9x6cm, 100% soft yellow/light brown eschar, slightly fluctuant. Small serosang drainage on old dressing. Periwound intact, without erythema.  Right lateral malleolus: 2 adherent scabs, largest 1.5x1.5cm  Bilateral heels intact.

## 2024-11-29 NOTE — PHYSICAL THERAPY INITIAL EVALUATION ADULT - NAME OF CLINICIAN
Left message with call ctr. To have group home call back in ref to missed appointments. 6/12/19.  lws MICHELLE Solomon

## 2024-11-29 NOTE — PROGRESS NOTE ADULT - PROBLEM SELECTOR PLAN 6
On admission: Hgb 10.3, MCV 91.2. No active signs of bleeding. On home iron supplements   As per the daughter had colonoscopy recently without concerning findings     Plan:   -f/u Iron studies. ferritin, B12, folate, reticulocyte count  -Hold iron supplement given sepsis On admission: Hgb 10.3, MCV 91.2. No active signs of bleeding. On home iron supplements   As per the daughter had colonoscopy recently without concerning findings   - most consistent with ACD    Plan:   -f/u B12, folate  -Hold iron supplement given sepsis

## 2024-11-29 NOTE — CONSULT NOTE ADULT - ATTENDING COMMENTS
79M w pmhx AFib, esophageal cancer (s/p partial esophagectomy), ulcerative colitis, DM, peripheral neuropathy, HTN, HLD, asthma, depression/anxiety admitted w somnolence/lethargy, found to be febrile, imaging c/f LLL lung consolidation, felt to have metabolic encephalopathy 2/2 PNA vs infected sacral ulcer.   D/w pts floor RN  - sacral ulcer unstageable but did not appear to have purulence or s/o surrounding SSTI.   11/28 BCx + GNRs. PCR id'ed as ESBL Proteus spp. Pending org id/s (4/4 bottles).   11/29 BCx specimen recvd.   - DC Vancomycin and Piptazo  - Start IV Ertapenem 1gm q24h  - F/u org id/s on 11/28 BCx and 11/29 surveillance BCx  - Will likely need 7d of Abx for GNR bacteremia    Dr. Clinton will cover the weekend and I will resume care on 12/2.

## 2024-11-29 NOTE — SWALLOW BEDSIDE ASSESSMENT ADULT - SLP GENERAL OBSERVATIONS
Pt received sleep in bed with head turned/twisted to the left.  Pt roused to voice + light sternal rub.  He did not open eyes or maintained same consistently but followed most basic commands during oromotor exam and swallow trials.  No verbal output except for "Ow" - could not report location of pain.

## 2024-11-29 NOTE — DIETITIAN INITIAL EVALUATION ADULT - PROBLEM SELECTOR PLAN 4
On CT found to have: In the medial aspect of the second portion of the duodenum, there are few air pockets, which may represent air within the duodenal diverticulum or contained perforation  Abdomen with mild diffuse tenderness on exam  As per ED provider Surgery was consulted and recommended no surgical intervention     Plan:   -f/u final surgery recs

## 2024-11-29 NOTE — CONSULT NOTE ADULT - SUBJECTIVE AND OBJECTIVE BOX
HPI:  Pt is a 79 M hx of Afib on Xarelto, HTN, Esophageal Cancer (in remission since early 2000s), ?CVA,  Crohns, Chronic colitis, T2DM, recent femur fracture in Oct 2024 was sent in from [facility] rehab for lethargy/AMS and hypotension, tachycardic to 160s at [facility]. As per report, patient was fine the day prior and BP improved after 200 cc IVF en route.    At baseline, he is  AOx3 however typically asks questions multiple times or gets confused on conversation, patient had a fall in Oct 2024, was found to have femur fracture and had surgery for this, he was then sent to [facility] where he had been until coming to ED. Prior to the fall, he lived alone and walked with walker. While at rehab he has been bed-bound and developed a sacral ulcer about 3 weeks ago. Of note patient was started on oral abx Keflex on 11/27, unclear indication     In the ED:   VS: T 100.4, ->88, /81, RR 16, Sat 100 in RA   Labs: Wbc 7.42 wiht neutrophil predominance. Hgb 10.3, Troponin 103->91, lactate 2.2->1.3, Bicarb 20, AG 17, creatinine 0.99, BUn 56, Glucose 2447, Alk phopsh 176, AST 9, ALT <5, UA: WBC 2, Bacteria negative, Cast 14  (Granular), Glucose 500, RVP negative   Imaging:  - CT head non-con: significant for severe chronic microvascular ischemic changes. No acute pathology   -CT a/p:  Left lower lobe pneumonia. A portion of the pancreatic tail herniating into the intrathoracic  space via esophageal hiatus. Ill-defined hypoattenuation of the  pancreatic head. The evaluation is limited secondary to motion artifact.  If prior studies are available, comparison can be made to evaluate the  stability of the hypoattenuation of the pancreatic head. Otherwise, if clinically warranted, abdominal MR can be considered. In the medial aspect of the second portion of the duodenum, there are  few air pockets, which may represent air within the duodenal diverticulum   or contained perforation. Evaluation limited without oral contrast. Moderate amount of steatorrhea. Skin thickening and subcutaneous fat stranding/small collection in the sacrum, consistent with sacral decubitus ulcer. No CT evidence of   osteomyelitis.  EKG: , QTc 398, regular narrow complex tachycardia, likely SVT   Interventions NS 2L bolus, Zosyn 3.375, vancomycin 1g 1x  Consults: surgery (28 Nov 2024 18:14)      PAST MEDICAL & SURGICAL HISTORY:  Esophageal cancer      Afib      HTN (hypertension)      DM (diabetes mellitus)      Crohn disease      History of femur fracture            ANTIBIOTICS:  MEDICATIONS  (STANDING):  ascorbic acid 500 milliGRAM(s) Oral two times a day  cefepime   IVPB 2000 milliGRAM(s) IV Intermittent once  cholecalciferol 1000 Unit(s) Oral daily  collagenase Ointment 1 Application(s) Topical daily  dextrose 5%. 1000 milliLiter(s) (50 mL/Hr) IV Continuous <Continuous>  dextrose 5%. 1000 milliLiter(s) (100 mL/Hr) IV Continuous <Continuous>  dextrose 50% Injectable 25 Gram(s) IV Push once  dextrose 50% Injectable 12.5 Gram(s) IV Push once  dextrose 50% Injectable 25 Gram(s) IV Push once  glucagon  Injectable 1 milliGRAM(s) IntraMuscular once  insulin lispro (ADMELOG) corrective regimen sliding scale   SubCutaneous three times a day before meals  insulin lispro (ADMELOG) corrective regimen sliding scale   SubCutaneous at bedtime  lactated ringers. 1000 milliLiter(s) (75 mL/Hr) IV Continuous <Continuous>  metoprolol succinate ER 25 milliGRAM(s) Oral every 24 hours  multivitamin 1 Tablet(s) Oral daily  pantoprazole    Tablet 40 milliGRAM(s) Oral before breakfast  rivaroxaban 20 milliGRAM(s) Oral with dinner  thiamine 100 milliGRAM(s) Oral daily  vancomycin  IVPB 1250 milliGRAM(s) IV Intermittent once  zinc sulfate 220 milliGRAM(s) Oral daily    MEDICATIONS  (PRN):  acetaminophen     Tablet .. 650 milliGRAM(s) Oral every 6 hours PRN Temp greater or equal to 38C (100.4F), Mild Pain (1 - 3)  aluminum hydroxide/magnesium hydroxide/simethicone Suspension 30 milliLiter(s) Oral every 4 hours PRN Dyspepsia  dextrose Oral Gel 15 Gram(s) Oral once PRN Blood Glucose LESS THAN 70 milliGRAM(s)/deciliter  melatonin 3 milliGRAM(s) Oral at bedtime PRN Insomnia  ondansetron Injectable 4 milliGRAM(s) IV Push every 8 hours PRN Nausea and/or Vomiting      Allergies    latex (Unknown)  No Known Drug Allergies    Intolerances        SOCIAL HISTORY:    FAMILY HISTORY:  Medical history unknown        Vital Signs Last 24 Hrs  T(C): 36.8 (29 Nov 2024 05:58), Max: 37.6 (28 Nov 2024 19:44)  T(F): 98.3 (29 Nov 2024 05:58), Max: 99.7 (28 Nov 2024 19:44)  HR: 82 (29 Nov 2024 05:58) (76 - 99)  BP: 138/74 (29 Nov 2024 05:58) (104/56 - 139/76)  BP(mean): --  RR: 18 (29 Nov 2024 05:58) (16 - 18)  SpO2: 98% (29 Nov 2024 05:58) (98% - 100%)    Parameters below as of 28 Nov 2024 20:56  Patient On (Oxygen Delivery Method): room air        11-28-24 @ 07:01  -  11-29-24 @ 07:00  --------------------------------------------------------  IN: 100 mL / OUT: 700 mL / NET: -600 mL        PHYSICAL EXAM:  Constitutional: NAD resting in bed  HEENT: EOMI	  Pulm: No respiratory distress, nonlabored breathing, on room air  Cardiovascular: HDS  Gastrointestinal: soft, TN/ND  Extremities: No edema  Derm: No obvious rashes  CNS: AOx3, No obvious focal deficits             LABS:                        8.9    5.72  )-----------( 268      ( 29 Nov 2024 07:24 )             28.4     11-29    140  |  106  |  39[H]  ----------------------------<  259[H]  4.4   |  21[L]  |  0.84    Ca    8.8      29 Nov 2024 07:24  Phos  2.5     11-29  Mg     1.7     11-29    TPro  6.6  /  Alb  3.1[L]  /  TBili  0.2  /  DBili  x   /  AST  7[L]  /  ALT  <5[L]  /  AlkPhos  148[H]  11-29    PT/INR - ( 28 Nov 2024 12:13 )   PT: 13.4 sec;   INR: 1.15          PTT - ( 28 Nov 2024 12:13 )  PTT:29.6 sec  Urinalysis Basic - ( 29 Nov 2024 07:24 )    Color: x / Appearance: x / SG: x / pH: x  Gluc: 259 mg/dL / Ketone: x  / Bili: x / Urobili: x   Blood: x / Protein: x / Nitrite: x   Leuk Esterase: x / RBC: x / WBC x   Sq Epi: x / Non Sq Epi: x / Bacteria: x        MICROBIOLOGY:  RADIOLOGY & ADDITIONAL STUDIES: HPI:  Pt is a 79 M hx of Afib on Xarelto, HTN, Esophageal Cancer (in remission since early 2000s), ?CVA,  Crohns, Chronic colitis, T2DM, recent femur fracture in Oct 2024 was sent in from [facility] rehab for lethargy/AMS and hypotension, tachycardic to 160s at [facility]. As per report, patient was fine the day prior and BP improved after 200 cc IVF en route.    At baseline, he is  AOx3 however typically asks questions multiple times or gets confused on conversation, patient had a fall in Oct 2024, was found to have femur fracture and had surgery for this, he was then sent to [facility] where he had been until coming to ED. Prior to the fall, he lived alone and walked with walker. While at rehab he has been bed-bound and developed a sacral ulcer about 3 weeks ago. Of note patient was started on oral abx Keflex on 11/27, unclear indication. Pt started on Vanc/Zosyn then CTX. Now Bcx growing Proteus/ESBL/CTX-M resistance marker. ID consulted for Abx recommendations.     In the ED:   VS: T 100.4, ->88, /81, RR 16, Sat 100 in RA   Labs: Wbc 7.42 wiht neutrophil predominance. Hgb 10.3, Troponin 103->91, lactate 2.2->1.3, Bicarb 20, AG 17, creatinine 0.99, BUn 56, Glucose 2447, Alk phopsh 176, AST 9, ALT <5, UA: WBC 2, Bacteria negative, Cast 14  (Granular), Glucose 500, RVP negative   Imaging:  - CT head non-con: significant for severe chronic microvascular ischemic changes. No acute pathology   -CT a/p:  Left lower lobe pneumonia. A portion of the pancreatic tail herniating into the intrathoracic  space via esophageal hiatus. Ill-defined hypoattenuation of the  pancreatic head. The evaluation is limited secondary to motion artifact.  If prior studies are available, comparison can be made to evaluate the  stability of the hypoattenuation of the pancreatic head. Otherwise, if clinically warranted, abdominal MR can be considered. In the medial aspect of the second portion of the duodenum, there are  few air pockets, which may represent air within the duodenal diverticulum   or contained perforation. Evaluation limited without oral contrast. Moderate amount of steatorrhea. Skin thickening and subcutaneous fat stranding/small collection in the sacrum, consistent with sacral decubitus ulcer. No CT evidence of   osteomyelitis.  EKG: , QTc 398, regular narrow complex tachycardia, likely SVT   Interventions NS 2L bolus, Zosyn 3.375, vancomycin 1g 1x  Consults: surgery       Pt seen and examined at bedside, lethargic on examination unable to participate in interview but was arousable and able to open eyes.    PAST MEDICAL & SURGICAL HISTORY:  Esophageal cancer      Afib      HTN (hypertension)      DM (diabetes mellitus)      Crohn disease      History of femur fracture            ANTIBIOTICS:  MEDICATIONS  (STANDING):  ascorbic acid 500 milliGRAM(s) Oral two times a day  cefepime   IVPB 2000 milliGRAM(s) IV Intermittent once  cholecalciferol 1000 Unit(s) Oral daily  collagenase Ointment 1 Application(s) Topical daily  dextrose 5%. 1000 milliLiter(s) (50 mL/Hr) IV Continuous <Continuous>  dextrose 5%. 1000 milliLiter(s) (100 mL/Hr) IV Continuous <Continuous>  dextrose 50% Injectable 25 Gram(s) IV Push once  dextrose 50% Injectable 12.5 Gram(s) IV Push once  dextrose 50% Injectable 25 Gram(s) IV Push once  glucagon  Injectable 1 milliGRAM(s) IntraMuscular once  insulin lispro (ADMELOG) corrective regimen sliding scale   SubCutaneous three times a day before meals  insulin lispro (ADMELOG) corrective regimen sliding scale   SubCutaneous at bedtime  lactated ringers. 1000 milliLiter(s) (75 mL/Hr) IV Continuous <Continuous>  metoprolol succinate ER 25 milliGRAM(s) Oral every 24 hours  multivitamin 1 Tablet(s) Oral daily  pantoprazole    Tablet 40 milliGRAM(s) Oral before breakfast  rivaroxaban 20 milliGRAM(s) Oral with dinner  thiamine 100 milliGRAM(s) Oral daily  vancomycin  IVPB 1250 milliGRAM(s) IV Intermittent once  zinc sulfate 220 milliGRAM(s) Oral daily    MEDICATIONS  (PRN):  acetaminophen     Tablet .. 650 milliGRAM(s) Oral every 6 hours PRN Temp greater or equal to 38C (100.4F), Mild Pain (1 - 3)  aluminum hydroxide/magnesium hydroxide/simethicone Suspension 30 milliLiter(s) Oral every 4 hours PRN Dyspepsia  dextrose Oral Gel 15 Gram(s) Oral once PRN Blood Glucose LESS THAN 70 milliGRAM(s)/deciliter  melatonin 3 milliGRAM(s) Oral at bedtime PRN Insomnia  ondansetron Injectable 4 milliGRAM(s) IV Push every 8 hours PRN Nausea and/or Vomiting      Allergies    latex (Unknown)  No Known Drug Allergies    Intolerances        SOCIAL HISTORY:    FAMILY HISTORY:  Medical history unknown        Vital Signs Last 24 Hrs  T(C): 36.8 (29 Nov 2024 05:58), Max: 37.6 (28 Nov 2024 19:44)  T(F): 98.3 (29 Nov 2024 05:58), Max: 99.7 (28 Nov 2024 19:44)  HR: 82 (29 Nov 2024 05:58) (76 - 99)  BP: 138/74 (29 Nov 2024 05:58) (104/56 - 139/76)  BP(mean): --  RR: 18 (29 Nov 2024 05:58) (16 - 18)  SpO2: 98% (29 Nov 2024 05:58) (98% - 100%)    Parameters below as of 28 Nov 2024 20:56  Patient On (Oxygen Delivery Method): room air        11-28-24 @ 07:01  -  11-29-24 @ 07:00  --------------------------------------------------------  IN: 100 mL / OUT: 700 mL / NET: -600 mL        PHYSICAL EXAM:  Constitutional: Lethargic unable to participate with exam   HEENT: Eye open to verbal stimuli however unable to participate in exam 	  Pulm: No respiratory distress, nonlabored breathing, on room air  Cardiovascular: HDS  Gastrointestinal: soft, ND  Extremities: No edema  Derm: unstageable sacral ulcer   CNS: lethargic but arousable to loud verbal stimuli and can open eyes and squeeze hand          LABS:                        8.9    5.72  )-----------( 268      ( 29 Nov 2024 07:24 )             28.4     11-29    140  |  106  |  39[H]  ----------------------------<  259[H]  4.4   |  21[L]  |  0.84    Ca    8.8      29 Nov 2024 07:24  Phos  2.5     11-29  Mg     1.7     11-29    TPro  6.6  /  Alb  3.1[L]  /  TBili  0.2  /  DBili  x   /  AST  7[L]  /  ALT  <5[L]  /  AlkPhos  148[H]  11-29    PT/INR - ( 28 Nov 2024 12:13 )   PT: 13.4 sec;   INR: 1.15          PTT - ( 28 Nov 2024 12:13 )  PTT:29.6 sec  Urinalysis Basic - ( 29 Nov 2024 07:24 )    Color: x / Appearance: x / SG: x / pH: x  Gluc: 259 mg/dL / Ketone: x  / Bili: x / Urobili: x   Blood: x / Protein: x / Nitrite: x   Leuk Esterase: x / RBC: x / WBC x   Sq Epi: x / Non Sq Epi: x / Bacteria: x        MICROBIOLOGY:  RADIOLOGY & ADDITIONAL STUDIES:

## 2024-11-29 NOTE — OCCUPATIONAL THERAPY INITIAL EVALUATION ADULT - ADDITIONAL COMMENTS
Pt is a poor historian at this time 2/2 decreased command follow and confusion. Per chart pt admitted from [facility] and required assist for all ADLs, per chart pt mostly bedbound.

## 2024-11-29 NOTE — DIETITIAN INITIAL EVALUATION ADULT - PROBLEM SELECTOR PLAN 3
On presentation HR 160s. EKG: , QTc 398, regular narrow complex tachycardia, likely SVT. Troponin downtrended Troponin 103->91. On admission VS stable s/p fluids HR 99, /76   Loud systolic murmur on R Sternal Border   Home meds: Metoprolol succinate 25 qd and Xarelto 20 qd    Plan:   -Repeat EKG  -c/w Metoprolol succinate 25 qd and Xarelto 20 qd  -obtain TTE

## 2024-11-29 NOTE — OCCUPATIONAL THERAPY INITIAL EVALUATION ADULT - GENERAL OBSERVATIONS, REHAB EVAL
Pt received semi-supine in bed +heplock +[place], in NAD and agreeable to OT. PT John present t/o. Cleared by MICHELLE Solomon to see pt.

## 2024-11-29 NOTE — OCCUPATIONAL THERAPY INITIAL EVALUATION ADULT - DIAGNOSIS, OT EVAL
Pt admitted from [facility] 2/2 AMS, found to have sepsis. Pt presents with impaired cognition, decreased strength, ROM, coordination, balance, endurance, impacting ability to perform ADL and mobility.

## 2024-11-29 NOTE — PROGRESS NOTE ADULT - PROBLEM SELECTOR PLAN 1
Patient presented with severe sepsis: Fever 100.4, , and elevated Lactate as well as lethargy. Source likely  PNA given L lower lobe opacity  vs Sacral ulcer   CT showed: Left lower lobe pneumonia. Skin thickening and subcutaneous fat stranding/small collection in the sacrum, consistent with sacral decubitus ulcer. No CT evidence of osteomyelitis.  Currently satting well in RA. Sacral Ulcer noted with foul smell purulent discharge   s/p vancomycin and zosyn and 2L of NS in the ED    Plan:   -c/w vancomycin   -increase Zosyn to 4.5 q8hr for PSA coverage given DM   -75cc/hr of LR  for 10 hours   -f/u Blood cultures  -collect sputum culture if producing   -wound care  -surgery recs regarding possible drain of sacral ulcer collection  -PT/OT consult , nutrition consult Patient presented with severe sepsis: Fever 100.4, , and elevated Lactate as well as lethargy. Source likely  PNA given L lower lobe opacity  vs Sacral ulcer   CT showed: Left lower lobe pneumonia. Skin thickening and subcutaneous fat stranding/small collection in the sacrum, consistent with sacral decubitus ulcer. No CT evidence of osteomyelitis.  Currently satting well in RA. Sacral Ulcer noted with foul smell purulent discharge   s/p vancomycin and zosyn and 2L of NS in the ED    Plan:   -c/w vancomycin   -increase Zosyn to 4.5 q8hr for PSA coverage given DM   -75cc/hr of LR  for 10 hours   -f/u Blood cultures  -collect sputum culture if producing   -wound care  -surgery recs regarding possible drain of sacral ulcer collection > no acute surgical intervention per surgery, but needs general wound care  -PT/OT consult , nutrition consult Patient presented with severe sepsis: Fever 100.4, , and elevated Lactate as well as lethargy. Source likely  PNA given L lower lobe opacity  vs Sacral ulcer   CT showed: Left lower lobe pneumonia. Skin thickening and subcutaneous fat stranding/small collection in the sacrum, consistent with sacral decubitus ulcer. No CT evidence of osteomyelitis.  Currently satting well in RA. Sacral Ulcer noted with foul smell purulent discharge   s/p vancomycin and zosyn and 2L of NS in the ED    Plan:   -d/c vanc, zosyn and start CFTX  -75cc/hr of LR  for 10 hours   -f/u Blood cultures  -collect sputum culture if producing   -wound care  -surgery recs regarding possible drain of sacral ulcer collection > no acute surgical intervention per surgery, but needs general wound care  -PT/OT consult , nutrition consult Patient presented with severe sepsis: Fever 100.4, , and elevated Lactate as well as lethargy. Source likely  PNA given L lower lobe opacity  vs Sacral ulcer   CT showed: Left lower lobe pneumonia. Skin thickening and subcutaneous fat stranding/small collection in the sacrum, consistent with sacral decubitus ulcer. No CT evidence of osteomyelitis.  Currently satting well in RA. Sacral Ulcer noted with foul smell purulent discharge   s/p vancomycin and zosyn and 2L of NS in the ED    Plan:   -d/c zosyn  - c/w vanc for purulent ulcer  - start CFTX for asp pna  -75cc/hr of LR  for 10 hours   -f/u Blood cultures  -collect sputum culture if producing   -wound care  -surgery recs regarding possible drain of sacral ulcer collection > no acute surgical intervention per surgery, but needs general wound care  -PT/OT consult , nutrition consult Patient presented with severe sepsis: Fever 100.4, , and elevated Lactate as well as lethargy. Source likely  PNA given L lower lobe opacity  vs Sacral ulcer   CT showed: Left lower lobe pneumonia. Skin thickening and subcutaneous fat stranding/small collection in the sacrum, consistent with sacral decubitus ulcer. No CT evidence of osteomyelitis.  Currently satting well in RA. Sacral Ulcer noted with foul smell purulent discharge   s/p vancomycin and zosyn and 2L of NS in the ED    Plan:   -cw zosyn given GNRs on bcx pending speciation  - c/w vanc for purulent ulcer  - start CFTX for asp pna  -75cc/hr of LR  for 10 hours   -collect sputum culture if producing   -wound care  -surgery recs regarding possible drain of sacral ulcer collection > no acute surgical intervention per surgery, but needs general wound care  -PT/OT consult , nutrition consult

## 2024-11-30 LAB
ANION GAP SERPL CALC-SCNC: 11 MMOL/L — SIGNIFICANT CHANGE UP (ref 5–17)
BUN SERPL-MCNC: 24 MG/DL — HIGH (ref 7–23)
CALCIUM SERPL-MCNC: 8.7 MG/DL — SIGNIFICANT CHANGE UP (ref 8.4–10.5)
CHLORIDE SERPL-SCNC: 103 MMOL/L — SIGNIFICANT CHANGE UP (ref 96–108)
CO2 SERPL-SCNC: 23 MMOL/L — SIGNIFICANT CHANGE UP (ref 22–31)
CREAT SERPL-MCNC: 0.62 MG/DL — SIGNIFICANT CHANGE UP (ref 0.5–1.3)
EGFR: 98 ML/MIN/1.73M2 — SIGNIFICANT CHANGE UP
GLUCOSE SERPL-MCNC: 202 MG/DL — HIGH (ref 70–99)
GRAM STN FLD: ABNORMAL
HCT VFR BLD CALC: 27.3 % — LOW (ref 39–50)
HGB BLD-MCNC: 8.4 G/DL — LOW (ref 13–17)
MAGNESIUM SERPL-MCNC: 1.8 MG/DL — SIGNIFICANT CHANGE UP (ref 1.6–2.6)
MCHC RBC-ENTMCNC: 27.3 PG — SIGNIFICANT CHANGE UP (ref 27–34)
MCHC RBC-ENTMCNC: 30.8 G/DL — LOW (ref 32–36)
MCV RBC AUTO: 88.6 FL — SIGNIFICANT CHANGE UP (ref 80–100)
NRBC # BLD: 0 /100 WBCS — SIGNIFICANT CHANGE UP (ref 0–0)
PHOSPHATE SERPL-MCNC: 2.1 MG/DL — LOW (ref 2.5–4.5)
PLATELET # BLD AUTO: 277 K/UL — SIGNIFICANT CHANGE UP (ref 150–400)
POTASSIUM SERPL-MCNC: 3.7 MMOL/L — SIGNIFICANT CHANGE UP (ref 3.5–5.3)
POTASSIUM SERPL-SCNC: 3.7 MMOL/L — SIGNIFICANT CHANGE UP (ref 3.5–5.3)
RBC # BLD: 3.08 M/UL — LOW (ref 4.2–5.8)
RBC # FLD: 14.2 % — SIGNIFICANT CHANGE UP (ref 10.3–14.5)
SODIUM SERPL-SCNC: 137 MMOL/L — SIGNIFICANT CHANGE UP (ref 135–145)
WBC # BLD: 5.47 K/UL — SIGNIFICANT CHANGE UP (ref 3.8–10.5)
WBC # FLD AUTO: 5.47 K/UL — SIGNIFICANT CHANGE UP (ref 3.8–10.5)

## 2024-11-30 PROCEDURE — 99233 SBSQ HOSP IP/OBS HIGH 50: CPT | Mod: GC

## 2024-11-30 PROCEDURE — 99232 SBSQ HOSP IP/OBS MODERATE 35: CPT | Mod: GC

## 2024-11-30 RX ORDER — POTASSIUM PHOSPHATE, MONOBASIC POTASSIUM PHOSPHATE, DIBASIC INJECTION, 236; 224 MG/ML; MG/ML
30 SOLUTION, CONCENTRATE INTRAVENOUS ONCE
Refills: 0 | Status: COMPLETED | OUTPATIENT
Start: 2024-11-30 | End: 2024-11-30

## 2024-11-30 RX ORDER — INSULIN GLARGINE 100 [IU]/ML
5 INJECTION, SOLUTION SUBCUTANEOUS AT BEDTIME
Refills: 0 | Status: DISCONTINUED | OUTPATIENT
Start: 2024-11-30 | End: 2024-12-02

## 2024-11-30 RX ADMIN — COLLAGENASE SANTYL 1 APPLICATION(S): 250 OINTMENT TOPICAL at 14:51

## 2024-11-30 RX ADMIN — Medication 4: at 09:37

## 2024-11-30 RX ADMIN — Medication 1000 UNIT(S): at 13:11

## 2024-11-30 RX ADMIN — METOPROLOL TARTRATE 25 MILLIGRAM(S): 100 TABLET, FILM COATED ORAL at 19:37

## 2024-11-30 RX ADMIN — ERTAPENEM 120 MILLIGRAM(S): 1 INJECTION, POWDER, LYOPHILIZED, FOR SOLUTION INTRAMUSCULAR; INTRAVENOUS at 17:57

## 2024-11-30 RX ADMIN — PANTOPRAZOLE SODIUM 40 MILLIGRAM(S): 40 TABLET, DELAYED RELEASE ORAL at 06:28

## 2024-11-30 RX ADMIN — Medication 1000 MICROGRAM(S): at 17:56

## 2024-11-30 RX ADMIN — INSULIN GLARGINE 5 UNIT(S): 100 INJECTION, SOLUTION SUBCUTANEOUS at 23:16

## 2024-11-30 RX ADMIN — Medication 500 MILLIGRAM(S): at 06:28

## 2024-11-30 RX ADMIN — POTASSIUM PHOSPHATE, MONOBASIC POTASSIUM PHOSPHATE, DIBASIC INJECTION, 83.33 MILLIMOLE(S): 236; 224 SOLUTION, CONCENTRATE INTRAVENOUS at 13:10

## 2024-11-30 RX ADMIN — Medication 4: at 17:57

## 2024-11-30 RX ADMIN — Medication 8: at 14:25

## 2024-11-30 RX ADMIN — Medication 220 MILLIGRAM(S): at 13:11

## 2024-11-30 RX ADMIN — Medication 500 MILLIGRAM(S): at 17:57

## 2024-11-30 RX ADMIN — Medication 75 MILLILITER(S): at 06:41

## 2024-11-30 RX ADMIN — Medication 100 MILLIGRAM(S): at 13:11

## 2024-11-30 RX ADMIN — Medication 1 TABLET(S): at 13:10

## 2024-11-30 RX ADMIN — RIVAROXABAN 20 MILLIGRAM(S): 10 TABLET, FILM COATED ORAL at 17:56

## 2024-11-30 NOTE — PROGRESS NOTE ADULT - SUBJECTIVE AND OBJECTIVE BOX
SUBJECTIVE: Patient seen and examined bedside. Pt unable to fully engage in conversation but shakes head no when asked if having abdominal pain or discomfort. Eating okay without pain or nausea.     ertapenem  IVPB      ertapenem  IVPB 1000 milliGRAM(s) IV Intermittent every 24 hours  metoprolol succinate ER 25 milliGRAM(s) Oral every 24 hours  rivaroxaban 20 milliGRAM(s) Oral with dinner      Vital Signs Last 24 Hrs  T(C): 36.4 (30 Nov 2024 08:05), Max: 38.2 (29 Nov 2024 23:17)  T(F): 97.5 (30 Nov 2024 08:05), Max: 100.7 (29 Nov 2024 23:17)  HR: 77 (30 Nov 2024 08:05) (77 - 86)  BP: 124/74 (30 Nov 2024 08:05) (110/63 - 156/71)  BP(mean): 90 (30 Nov 2024 08:05) (90 - 100)  RR: 18 (30 Nov 2024 08:05) (16 - 18)  SpO2: 97% (30 Nov 2024 08:05) (93% - 98%)    Parameters below as of 30 Nov 2024 08:05  Patient On (Oxygen Delivery Method): room air      I&O's Detail    29 Nov 2024 07:01  -  30 Nov 2024 07:00  --------------------------------------------------------  IN:    Lactated Ringers: 600 mL  Total IN: 600 mL    OUT:    Voided (mL): 300 mL  Total OUT: 300 mL    Total NET: 300 mL          General: NAD, resting comfortably in bed  C/V: NSR  Pulm: Nonlabored breathing, no respiratory distress  Abd: soft, NT/ND.  Sacrum: stable sacral decubitus ulcer with small eschar, no underlying fluctuance  Extrem: WWP, no edema, SCDs in place        LABS:                        8.4    5.47  )-----------( 277      ( 30 Nov 2024 05:30 )             27.3     11-30    137  |  103  |  24[H]  ----------------------------<  202[H]  3.7   |  23  |  0.62    Ca    8.7      30 Nov 2024 05:30  Phos  2.1     11-30  Mg     1.8     11-30    TPro  6.6  /  Alb  3.1[L]  /  TBili  0.2  /  DBili  x   /  AST  7[L]  /  ALT  <5[L]  /  AlkPhos  148[H]  11-29      Urinalysis Basic - ( 30 Nov 2024 05:30 )    Color: x / Appearance: x / SG: x / pH: x  Gluc: 202 mg/dL / Ketone: x  / Bili: x / Urobili: x   Blood: x / Protein: x / Nitrite: x   Leuk Esterase: x / RBC: x / WBC x   Sq Epi: x / Non Sq Epi: x / Bacteria: x        RADIOLOGY & ADDITIONAL STUDIES:

## 2024-11-30 NOTE — PROGRESS NOTE ADULT - ASSESSMENT
80yo Male pt with PMH of Afib (on Xarelto), HTN, T2DM, Chron's Disease, dementia, depression, esophageal carcinoma (remission since early 2000s), significant hearing loss, and femur fracture (10/2024); and PSH of distal esopahgectomy?. Patient was brought to ED from [facility] for lethargy, altered mental status, tachycardic and hypotension during the morning. On exam, abdomen soft, NT, ND, no guarding, no rigidity with upper midline incision, well healed. Sacrum with sacral decubitus ulcer 5x6cm with minimal nonviable tissue, inferior eschar, and surrounding erythema; with no palpable fluctuance. CTAP showing In the medial aspect of the second portion of the duodenum, there are few air pockets, which may represent air within the duodenal diverticulum or contained perforation. Skin thickening and subcutaneous fat stranding/small collection in the sacrum, consistent with sacral decubitus ulcer. Patient admitted to medicine service for sepsis, and found to have Left Lower Lobe pneumonia and UTI. Surgery consulted for possible duodenal diverticulum contained perforation and sacral decubitus ulcer. Given stable benign abdominal exam, no WBC, and toleration of PO intake very low suspicion for contained duodenal perforation and most likely air within diverticulum. Sacral decubitus ulcer without underlying fluctuance to suggest abscess. Sepsis etiology more likely from pneumonia or UTI. Discussed with patient's daughter/HCP that sacral decub is unlikely the source of infection at this time and debridement would require holding Xarelto and may not improve his clinical picture. She is in agreement with deferring debridement at this time.    Recommendations:  No acute surgical intervention  Appreciate ID recs  Appreciate wound care recs  Care per primary team    [email or web address] will sign off at this time

## 2024-11-30 NOTE — PROGRESS NOTE ADULT - ASSESSMENT
79 M hx of afib on xarelto, htn, esophageal neoplasm, crohns, T2DM, sacral ulcer, here for lethargy/AMS and hypotension in NH noticed this morning found to have sepsis, likely i/s/o pneumonia vs sacral ulcer. Patient with proteus species bacteremia.

## 2024-11-30 NOTE — PROGRESS NOTE ADULT - SUBJECTIVE AND OBJECTIVE BOX
Patient is a 79y old  Male who presents with a chief complaint of Severe sepsis (30 Nov 2024 11:38)      SUBJECTIVE / OVERNIGHT EVENTS: Patient seen and examined at bedside. No acute events overnight. Answering "okay" to every single question. Appears comfortable and arousable.    MEDICATIONS  (STANDING):  ascorbic acid 500 milliGRAM(s) Oral two times a day  cholecalciferol 1000 Unit(s) Oral daily  collagenase Ointment 1 Application(s) Topical daily  dextrose 5%. 1000 milliLiter(s) (50 mL/Hr) IV Continuous <Continuous>  dextrose 5%. 1000 milliLiter(s) (100 mL/Hr) IV Continuous <Continuous>  dextrose 50% Injectable 25 Gram(s) IV Push once  dextrose 50% Injectable 12.5 Gram(s) IV Push once  dextrose 50% Injectable 25 Gram(s) IV Push once  ertapenem  IVPB      ertapenem  IVPB 1000 milliGRAM(s) IV Intermittent every 24 hours  glucagon  Injectable 1 milliGRAM(s) IntraMuscular once  insulin glargine Injectable (LANTUS) 5 Unit(s) SubCutaneous at bedtime  insulin lispro (ADMELOG) corrective regimen sliding scale   SubCutaneous three times a day before meals  insulin lispro (ADMELOG) corrective regimen sliding scale   SubCutaneous at bedtime  lactated ringers. 1000 milliLiter(s) (75 mL/Hr) IV Continuous <Continuous>  metoprolol succinate ER 25 milliGRAM(s) Oral every 24 hours  multivitamin 1 Tablet(s) Oral daily  pantoprazole    Tablet 40 milliGRAM(s) Oral before breakfast  rivaroxaban 20 milliGRAM(s) Oral with dinner  thiamine 100 milliGRAM(s) Oral daily  zinc sulfate 220 milliGRAM(s) Oral daily    MEDICATIONS  (PRN):  acetaminophen     Tablet .. 650 milliGRAM(s) Oral every 6 hours PRN Temp greater or equal to 38C (100.4F), Mild Pain (1 - 3)  aluminum hydroxide/magnesium hydroxide/simethicone Suspension 30 milliLiter(s) Oral every 4 hours PRN Dyspepsia  dextrose Oral Gel 15 Gram(s) Oral once PRN Blood Glucose LESS THAN 70 milliGRAM(s)/deciliter  melatonin 3 milliGRAM(s) Oral at bedtime PRN Insomnia  ondansetron Injectable 4 milliGRAM(s) IV Push every 8 hours PRN Nausea and/or Vomiting      CAPILLARY BLOOD GLUCOSE      POCT Blood Glucose.: 323 mg/dL (30 Nov 2024 13:51)  POCT Blood Glucose.: 213 mg/dL (30 Nov 2024 09:24)  POCT Blood Glucose.: 221 mg/dL (29 Nov 2024 21:48)  POCT Blood Glucose.: 155 mg/dL (29 Nov 2024 17:46)    I&O's Summary    29 Nov 2024 07:01  -  30 Nov 2024 07:00  --------------------------------------------------------  IN: 600 mL / OUT: 300 mL / NET: 300 mL        PHYSICAL EXAM:  Vital Signs Last 24 Hrs  T(C): 36.4 (30 Nov 2024 08:05), Max: 38.2 (29 Nov 2024 23:17)  T(F): 97.5 (30 Nov 2024 08:05), Max: 100.7 (29 Nov 2024 23:17)  HR: 77 (30 Nov 2024 08:05) (77 - 86)  BP: 124/74 (30 Nov 2024 08:05) (110/63 - 156/71)  BP(mean): 90 (30 Nov 2024 08:05) (90 - 100)  RR: 18 (30 Nov 2024 08:05) (16 - 18)  SpO2: 97% (30 Nov 2024 08:05) (93% - 98%)    Parameters below as of 30 Nov 2024 08:05  Patient On (Oxygen Delivery Method): room air        GEN: male in NAD, appears comfortable, no diaphoresis  EYES: No scleral injection  ENTM: neck supple & symmetric without tracheal deviation, moist membranes, no gross hearing impairment, thyroid gland not enlarged  CV: +S1/S2, no m/r/g, no abdominal bruit, no LE edema  RESP: breathing comfortably, no respiratory accessory muscle use, CTAB, no w/r/r  GI: normoactive BS, soft, NTND, no rebounding/guarding, no palpable masses    LABS:                        8.4    5.47  )-----------( 277      ( 30 Nov 2024 05:30 )             27.3     11-30    137  |  103  |  24[H]  ----------------------------<  202[H]  3.7   |  23  |  0.62    Ca    8.7      30 Nov 2024 05:30  Phos  2.1     11-30  Mg     1.8     11-30    TPro  6.6  /  Alb  3.1[L]  /  TBili  0.2  /  DBili  x   /  AST  7[L]  /  ALT  <5[L]  /  AlkPhos  148[H]  11-29          Urinalysis Basic - ( 30 Nov 2024 05:30 )    Color: x / Appearance: x / SG: x / pH: x  Gluc: 202 mg/dL / Ketone: x  / Bili: x / Urobili: x   Blood: x / Protein: x / Nitrite: x   Leuk Esterase: x / RBC: x / WBC x   Sq Epi: x / Non Sq Epi: x / Bacteria: x        Culture - Blood (collected 28 Nov 2024 13:14)  Source: .Blood BLOOD  Gram Stain (29 Nov 2024 11:05):    Growth in aerobic bottle: Gram Negative Rods    Growth in anaerobic bottle: Gram Negative Rods  Preliminary Report (29 Nov 2024 11:05):    Growth in aerobic bottle: Gram Negative Rods    Growth in anaerobic bottle: Gram Negative Rods    Culture - Blood (collected 28 Nov 2024 13:14)  Source: .Blood BLOOD  Gram Stain (29 Nov 2024 11:52):    Growth in aerobic bottle: Gram Negative Rods    Growth in anaerobic bottle: Gram Negative Rods  Preliminary Report (29 Nov 2024 11:52):    Growth in aerobic bottle: Gram Negative Rods    Growth in anaerobic bottle: Gram Negative Rods    Direct identification is available within approximately 3-5    hours either by Blood Panel Multiplexed PCR or Direct    MALDI-TOF. Details: [email or web address]  Organism: Blood Culture PCR (29 Nov 2024 13:09)  Organism: Blood Culture PCR (29 Nov 2024 13:09)    Urinalysis with Rflx Culture (collected 28 Nov 2024 11:50)        RADIOLOGY & ADDITIONAL TESTS:  Results Reviewed:   Imaging Personally Reviewed:  Electrocardiogram Personally Reviewed:    COORDINATION OF CARE:  Care Discussed with Consultants/Other Providers [Y/N]:  Prior or Outpatient Records Reviewed [Y/N]:

## 2024-11-30 NOTE — PROGRESS NOTE ADULT - ASSESSMENT
Pt is a 79 M with PMHx of Afib on Xarelto, HTN, Esophageal Cancer (in remission since early ), ?CVA,  Crohns, Chronic colitis, T2DM, recent femur fracture in Oct 2024, who was sent in from [facility] rehab (were he resided since Hip surgery in October) for lethargy/AMS, hypotension and tachycardic to 160s at [facility]. Per chart review, prior to the fall, pt lived alone and walked with walker. While at rehab he has been bed-bound and developed a sacral ulcer about 3 weeks ago and was started on oral abx Keflex on , unclear indication. In the ED pt noted for have T 100.4 and received Zosyn 3.375, vancomycin 1g, Bcx growing Proteus/ESBL/CTX-M resistance marker ID consulted for Abx recommendations, now on Ertapenem.      Labs    UA neg   : RVP neg  : Bcx x2 growing Proteus, ESBL/CTX-M resistance marker     Urine Strep/Legionella neg    StapA +, MRSA neg     Imagin/28 CXR: small patchy opacities the largest of which is at the left lung base    CTH No acute pathology    CTAP: Left lower lobe pneumonia. In the medial aspect of the second portion of the duodenum, there are  few air pockets, which may represent air within the duodenal diverticulum or contained perforation. Skin thickening and subcutaneous fat stranding/small collection in the sacrum, consistent with sacral decubitus ulcer. No CT evidence of   osteomyelitis.  -  TTE no vegetations  CXR     #Severe Sepsis   #PNA  #Sacral ulcer  Pt presented with Severe sepsis s/p Vanc Zosyn in ED, then narrowed to CTX now Bcx growing Proteus/ESBL/CTX-M resistance marker. Pt afebrile, HDS, WBC downtrending 7->5. Recommend starting Ertapenem and f/u culture specification and sensitives. If cx grows PsA, will need to adjust Abx.       - Repeat Bcx (, specimen received)  - f/u : Bcx x2 growing Proteus, ESBL/CTX-M resistance marker   - c/w Ertapenem 1g q24h       ID Team 1 will follow  Pt is a 79 M with PMHx of Afib on Xarelto, HTN, Esophageal Cancer (in remission since early ), ?CVA,  Crohns, Chronic colitis, T2DM, recent femur fracture in Oct 2024, who was sent in from [facility] rehab (were he resided since Hip surgery in October) for lethargy/AMS, hypotension and tachycardic to 160s at [facility]. Per chart review, prior to the fall, pt lived alone and walked with walker. While at rehab he has been bed-bound and developed a sacral ulcer about 3 weeks ago and was started on oral abx Keflex on , unclear indication. In the ED pt noted for have T 100.4 and received Zosyn 3.375, vancomycin 1g, Bcx growing Proteus/ESBL/CTX-M resistance marker ID consulted for Abx recommendations, now on Ertapenem.      Labs    UA neg   : RVP neg  : Bcx x2 growing Proteus, ESBL/CTX-M resistance marker     Urine Strep/Legionella neg    StapA +, MRSA neg     Imagin/28 CXR: small patchy opacities the largest of which is at the left lung base    CTH No acute pathology    CTAP: Left lower lobe pneumonia. In the medial aspect of the second portion of the duodenum, there are  few air pockets, which may represent air within the duodenal diverticulum or contained perforation. Skin thickening and subcutaneous fat stranding/small collection in the sacrum, consistent with sacral decubitus ulcer. No CT evidence of   osteomyelitis.  -  TTE no vegetations  CXR     #Severe Sepsis   #PNA  #Sacral ulcer  Pt presented with Severe sepsis s/p Vanc Zosyn in ED, then narrowed to CTX now Bcx growing Proteus/ESBL/CTX-M resistance marker. Pt afebrile, HDS, WBC downtrending 7->5. Recommended starting Ertapenem and f/u culture specification and sensitivies. If cx grows PsA, will need to adjust Abx.       - Repeat Bcx (, specimen received)  - f/u : Bcx x2 growing Proteus, ESBL/CTX-M resistance marker   - c/w Ertapenem 1g q24h       ID Team 1 will follow

## 2024-11-30 NOTE — PROGRESS NOTE ADULT - SUBJECTIVE AND OBJECTIVE BOX
INTERVAL HPI/OVERNIGHT EVENTS: T 100.6 and 100.7F, given Tylenol and ice packs. Surveillance cx collected prior on 11/29.    SUBJECTIVE: Patient seen and examined at bedside. Responds to sternal rub, opens eyes, and states "okay" when asked questions during interview. Follows some commands. Unable to assess ROS (i.e. cough, wheezing, chest pain, N/V/D, abdominal pain, urinary frequency/urgency/dysuria) given mentation.      ANTIBIOTICS/RELEVANT/ MEDICATIONS  (STANDING):  ertapenem  IVPB 1000 milliGRAM(s) IV Intermittent every 24 hours  ascorbic acid 500 milliGRAM(s) Oral two times a day  cholecalciferol 1000 Unit(s) Oral daily  collagenase Ointment 1 Application(s) Topical daily  dextrose 5%. 1000 milliLiter(s) (50 mL/Hr) IV Continuous <Continuous>  dextrose 5%. 1000 milliLiter(s) (100 mL/Hr) IV Continuous <Continuous>  dextrose 50% Injectable 25 Gram(s) IV Push once  dextrose 50% Injectable 12.5 Gram(s) IV Push once  dextrose 50% Injectable 25 Gram(s) IV Push once  glucagon  Injectable 1 milliGRAM(s) IntraMuscular once  insulin lispro (ADMELOG) corrective regimen sliding scale   SubCutaneous three times a day before meals  insulin lispro (ADMELOG) corrective regimen sliding scale   SubCutaneous at bedtime  lactated ringers. 1000 milliLiter(s) (75 mL/Hr) IV Continuous <Continuous>  metoprolol succinate ER 25 milliGRAM(s) Oral every 24 hours  multivitamin 1 Tablet(s) Oral daily  pantoprazole    Tablet 40 milliGRAM(s) Oral before breakfast  potassium phosphate IVPB 30 milliMole(s) IV Intermittent once  rivaroxaban 20 milliGRAM(s) Oral with dinner  thiamine 100 milliGRAM(s) Oral daily  zinc sulfate 220 milliGRAM(s) Oral daily    MEDICATIONS  (PRN):  acetaminophen     Tablet .. 650 milliGRAM(s) Oral every 6 hours PRN Temp greater or equal to 38C (100.4F), Mild Pain (1 - 3)  aluminum hydroxide/magnesium hydroxide/simethicone Suspension 30 milliLiter(s) Oral every 4 hours PRN Dyspepsia  dextrose Oral Gel 15 Gram(s) Oral once PRN Blood Glucose LESS THAN 70 milliGRAM(s)/deciliter  melatonin 3 milliGRAM(s) Oral at bedtime PRN Insomnia  ondansetron Injectable 4 milliGRAM(s) IV Push every 8 hours PRN Nausea and/or Vomiting          Vital Signs Last 24 Hrs  T(C): 36.4 (30 Nov 2024 08:05), Max: 38.2 (29 Nov 2024 23:17)  T(F): 97.5 (30 Nov 2024 08:05), Max: 100.7 (29 Nov 2024 23:17)  HR: 77 (30 Nov 2024 08:05) (77 - 86)  BP: 124/74 (30 Nov 2024 08:05) (110/63 - 156/71)  BP(mean): 90 (30 Nov 2024 08:05) (90 - 100)  RR: 18 (30 Nov 2024 08:05) (16 - 18)  SpO2: 97% (30 Nov 2024 08:05) (93% - 98%)    Parameters below as of 30 Nov 2024 08:05  Patient On (Oxygen Delivery Method): room air        PHYSICAL EXAM:  Constitutional: Lethargic though responds to sternal rub  HEENT: Eyes open to verbal stimuli however unable to participate in exam 	  Pulm: No respiratory distress, nonlabored breathing, on room air  Cardiovascular: +S1/S2, RRR. No murmurs, rubs, or gallops.  Gastrointestinal: soft, ND  Extremities: No edema  Derm: unstageable sacral ulcer   CNS: lethargic but arousable to loud verbal stimuli and can open eyes and squeeze hand      LABS:                        8.4    5.47  )-----------( 277      ( 30 Nov 2024 05:30 )             27.3         11-30    137  |  103  |  24[H]  ----------------------------<  202[H]  3.7   |  23  |  0.62    Ca    8.7      30 Nov 2024 05:30  Phos  2.1     11-30  Mg     1.8     11-30    TPro  6.6  /  Alb  3.1[L]  /  TBili  0.2  /  DBili  x   /  AST  7[L]  /  ALT  <5[L]  /  AlkPhos  148[H]  11-29      Urinalysis Basic - ( 30 Nov 2024 05:30 )    Color: x / Appearance: x / SG: x / pH: x  Gluc: 202 mg/dL / Ketone: x  / Bili: x / Urobili: x   Blood: x / Protein: x / Nitrite: x   Leuk Esterase: x / RBC: x / WBC x   Sq Epi: x / Non Sq Epi: x / Bacteria: x        MICROBIOLOGY:    Culture - Blood (collected 28 Nov 2024 13:14)  Source: .Blood BLOOD  Gram Stain (29 Nov 2024 11:05):    Growth in aerobic bottle: Gram Negative Rods    Growth in anaerobic bottle: Gram Negative Rods  Preliminary Report (29 Nov 2024 11:05):    Growth in aerobic bottle: Gram Negative Rods    Growth in anaerobic bottle: Gram Negative Rods    Culture - Blood (collected 28 Nov 2024 13:14)  Source: .Blood BLOOD  Gram Stain (29 Nov 2024 11:52):    Growth in aerobic bottle: Gram Negative Rods    Growth in anaerobic bottle: Gram Negative Rods  Preliminary Report (29 Nov 2024 11:52):    Growth in aerobic bottle: Gram Negative Rods    Growth in anaerobic bottle: Gram Negative Rods    Direct identification is available within approximately 3-5    hours either by Blood Panel Multiplexed PCR or Direct    MALDI-TOF. Details: [email or web address]  Organism: Blood Culture PCR (29 Nov 2024 13:09)  Organism: Blood Culture PCR (29 Nov 2024 13:09)    Urinalysis with Rflx Culture (collected 28 Nov 2024 11:50)          RADIOLOGY & ADDITIONAL STUDIES:

## 2024-11-30 NOTE — PROGRESS NOTE ADULT - PROBLEM SELECTOR PLAN 1
Patient presented with severe sepsis: Fever 100.4, , and elevated Lactate as well as lethargy. Source likely  PNA given L lower lobe opacity  vs Sacral ulcer   CT showed: Left lower lobe pneumonia. Skin thickening and subcutaneous fat stranding/small collection in the sacrum, consistent with sacral decubitus ulcer. No CT evidence of osteomyelitis.  Currently satting well in RA. Sacral Ulcer noted with foul smell purulent discharge     Plan:   - BCx with 2/2 GNR, proteus, ESBL  - ID consulted  - Currently on Ertapenem  - Follow up surveillance cultures (11/30)  - Follow up sensitivities of admission cultures  - Wound care for unstageable sacral wound  - surgery recs regarding possible drain of sacral ulcer collection > no acute surgical intervention per surgery, but needs general wound care

## 2024-11-30 NOTE — PROGRESS NOTE ADULT - PROBLEM SELECTOR PLAN 6
On admission: Hgb 10.3, MCV 91.2. No active signs of bleeding. On home iron supplements   As per the daughter had colonoscopy recently without concerning findings   - most consistent with ACD    Plan:   -f/u B12 (low normal) --> start B12 supplementation  -Folate WNL  -Hold iron supplement given sepsis

## 2024-12-01 LAB
-  AMPICILLIN/SULBACTAM: SIGNIFICANT CHANGE UP
-  AMPICILLIN: SIGNIFICANT CHANGE UP
-  CEFAZOLIN: SIGNIFICANT CHANGE UP
-  CEFEPIME: SIGNIFICANT CHANGE UP
-  CEFTRIAXONE: SIGNIFICANT CHANGE UP
-  CIPROFLOXACIN: SIGNIFICANT CHANGE UP
-  ERTAPENEM: SIGNIFICANT CHANGE UP
-  GENTAMICIN: SIGNIFICANT CHANGE UP
-  LEVOFLOXACIN: SIGNIFICANT CHANGE UP
-  MEROPENEM: SIGNIFICANT CHANGE UP
-  PIPERACILLIN/TAZOBACTAM: SIGNIFICANT CHANGE UP
-  TOBRAMYCIN: SIGNIFICANT CHANGE UP
-  TRIMETHOPRIM/SULFAMETHOXAZOLE: SIGNIFICANT CHANGE UP
ANION GAP SERPL CALC-SCNC: 7 MMOL/L — SIGNIFICANT CHANGE UP (ref 5–17)
BUN SERPL-MCNC: 22 MG/DL — SIGNIFICANT CHANGE UP (ref 7–23)
CALCIUM SERPL-MCNC: 8.5 MG/DL — SIGNIFICANT CHANGE UP (ref 8.4–10.5)
CHLORIDE SERPL-SCNC: 109 MMOL/L — HIGH (ref 96–108)
CO2 SERPL-SCNC: 24 MMOL/L — SIGNIFICANT CHANGE UP (ref 22–31)
CREAT SERPL-MCNC: 0.63 MG/DL — SIGNIFICANT CHANGE UP (ref 0.5–1.3)
CULTURE RESULTS: ABNORMAL
EGFR: 97 ML/MIN/1.73M2 — SIGNIFICANT CHANGE UP
GLUCOSE SERPL-MCNC: 210 MG/DL — HIGH (ref 70–99)
HCT VFR BLD CALC: 27 % — LOW (ref 39–50)
HGB BLD-MCNC: 8.5 G/DL — LOW (ref 13–17)
MAGNESIUM SERPL-MCNC: 1.5 MG/DL — LOW (ref 1.6–2.6)
MCHC RBC-ENTMCNC: 28.4 PG — SIGNIFICANT CHANGE UP (ref 27–34)
MCHC RBC-ENTMCNC: 31.5 G/DL — LOW (ref 32–36)
MCV RBC AUTO: 90.3 FL — SIGNIFICANT CHANGE UP (ref 80–100)
METHOD TYPE: SIGNIFICANT CHANGE UP
NRBC # BLD: 0 /100 WBCS — SIGNIFICANT CHANGE UP (ref 0–0)
ORGANISM # SPEC MICROSCOPIC CNT: ABNORMAL
ORGANISM # SPEC MICROSCOPIC CNT: ABNORMAL
ORGANISM # SPEC MICROSCOPIC CNT: SIGNIFICANT CHANGE UP
PHOSPHATE SERPL-MCNC: 2.7 MG/DL — SIGNIFICANT CHANGE UP (ref 2.5–4.5)
PLATELET # BLD AUTO: 288 K/UL — SIGNIFICANT CHANGE UP (ref 150–400)
POTASSIUM SERPL-MCNC: 4 MMOL/L — SIGNIFICANT CHANGE UP (ref 3.5–5.3)
POTASSIUM SERPL-SCNC: 4 MMOL/L — SIGNIFICANT CHANGE UP (ref 3.5–5.3)
RBC # BLD: 2.99 M/UL — LOW (ref 4.2–5.8)
RBC # FLD: 14.5 % — SIGNIFICANT CHANGE UP (ref 10.3–14.5)
SODIUM SERPL-SCNC: 140 MMOL/L — SIGNIFICANT CHANGE UP (ref 135–145)
SPECIMEN SOURCE: SIGNIFICANT CHANGE UP
WBC # BLD: 7.46 K/UL — SIGNIFICANT CHANGE UP (ref 3.8–10.5)
WBC # FLD AUTO: 7.46 K/UL — SIGNIFICANT CHANGE UP (ref 3.8–10.5)

## 2024-12-01 PROCEDURE — 99232 SBSQ HOSP IP/OBS MODERATE 35: CPT | Mod: GC

## 2024-12-01 PROCEDURE — 99233 SBSQ HOSP IP/OBS HIGH 50: CPT | Mod: GC

## 2024-12-01 RX ADMIN — ERTAPENEM 120 MILLIGRAM(S): 1 INJECTION, POWDER, LYOPHILIZED, FOR SOLUTION INTRAMUSCULAR; INTRAVENOUS at 18:36

## 2024-12-01 RX ADMIN — PANTOPRAZOLE SODIUM 40 MILLIGRAM(S): 40 TABLET, DELAYED RELEASE ORAL at 07:13

## 2024-12-01 RX ADMIN — METOPROLOL TARTRATE 25 MILLIGRAM(S): 100 TABLET, FILM COATED ORAL at 18:36

## 2024-12-01 RX ADMIN — Medication 1000 UNIT(S): at 11:54

## 2024-12-01 RX ADMIN — COLLAGENASE SANTYL 1 APPLICATION(S): 250 OINTMENT TOPICAL at 11:54

## 2024-12-01 RX ADMIN — Medication 500 MILLIGRAM(S): at 18:36

## 2024-12-01 RX ADMIN — Medication 2: at 18:05

## 2024-12-01 RX ADMIN — Medication 500 MILLIGRAM(S): at 07:12

## 2024-12-01 RX ADMIN — Medication 100 MILLIGRAM(S): at 11:54

## 2024-12-01 RX ADMIN — INSULIN GLARGINE 5 UNIT(S): 100 INJECTION, SOLUTION SUBCUTANEOUS at 22:01

## 2024-12-01 RX ADMIN — Medication 220 MILLIGRAM(S): at 11:54

## 2024-12-01 RX ADMIN — RIVAROXABAN 20 MILLIGRAM(S): 10 TABLET, FILM COATED ORAL at 18:36

## 2024-12-01 RX ADMIN — Medication 4: at 10:00

## 2024-12-01 RX ADMIN — Medication 4: at 13:28

## 2024-12-01 RX ADMIN — Medication 1 TABLET(S): at 11:54

## 2024-12-01 RX ADMIN — Medication 1000 MICROGRAM(S): at 11:54

## 2024-12-01 RX ADMIN — ACETAMINOPHEN 500MG 650 MILLIGRAM(S): 500 TABLET, COATED ORAL at 19:37

## 2024-12-01 NOTE — PROGRESS NOTE ADULT - SUBJECTIVE AND OBJECTIVE BOX
Patient is a 79y old  Male who presents with a chief complaint of Severe sepsis (30 Nov 2024 14:08)    OVERNIGHT EVENTS: [place]    SUBJECTIVE: seen at bedside this am, no acute complaints    ROS: otherwise negative      T(C): 37.7 (11-30-24 @ 16:11), Max: 37.7 (11-30-24 @ 16:11)  HR: 91 (11-30-24 @ 16:11) (77 - 91)  BP: 130/81 (11-30-24 @ 16:11) (124/74 - 130/81)  RR: 17 (11-30-24 @ 16:11) (17 - 18)  SpO2: 97% (11-30-24 @ 16:11) (97% - 97%)  Wt(kg): --Vital Signs Last 24 Hrs  T(C): 37.7 (30 Nov 2024 16:11), Max: 37.7 (30 Nov 2024 16:11)  T(F): 99.8 (30 Nov 2024 16:11), Max: 99.8 (30 Nov 2024 16:11)  HR: 91 (30 Nov 2024 16:11) (77 - 91)  BP: 130/81 (30 Nov 2024 16:11) (124/74 - 130/81)  BP(mean): 97 (30 Nov 2024 16:11) (90 - 97)  RR: 17 (30 Nov 2024 16:11) (17 - 18)  SpO2: 97% (30 Nov 2024 16:11) (97% - 97%)    Parameters below as of 30 Nov 2024 16:11  Patient On (Oxygen Delivery Method): room air        PHYSICAL EXAM:  Constitutional: resting comfortably in bed; NAD  Head: NC/AT  Eyes: PERRL, EOMI, anicteric sclera  ENT: no nasal discharge; MMM  Neck: supple; no JVD or thyromegaly  Respiratory: CTA B/L; no W/R/R, no retractions  Cardiac: +S1/S2; RRR; no M/R/G  Gastrointestinal: soft, NT/ND; no rebound or guarding; +BSx4  Back: spine midline, no bony tenderness or step-offs; no CVAT B/L  Extremities: WWP, no clubbing or cyanosis; no peripheral edema. Capillary refill <2 sec  Musculoskeletal: NROM x4; no joint swelling, tenderness or erythema  Vascular: 2+ radial, DP/PT pulses B/L  Dermatologic: skin warm, dry and intact; no rashes, wounds, or scars  Lymphatic: no submandibular or cervical LAD  Neurologic: AAOx3; CNII-XII grossly intact; no focal deficits  Psychiatric: affect and characteristics of appearance, verbalizations, behaviors are appropriate    LABS:                        8.4    5.47  )-----------( 277      ( 30 Nov 2024 05:30 )             27.3     11-30    137  |  103  |  24[H]  ----------------------------<  202[H]  3.7   |  23  |  0.62    Ca    8.7      30 Nov 2024 05:30  Phos  2.1     11-30  Mg     1.8     11-30    TPro  6.6  /  Alb  3.1[L]  /  TBili  0.2  /  DBili  x   /  AST  7[L]  /  ALT  <5[L]  /  AlkPhos  148[H]  11-29    Iron 14, TIBC 148, %Sat 9, Ferritin 488/ 11-29-24 @ 07:24      Urinalysis Basic - ( 30 Nov 2024 05:30 )    Color: x / Appearance: x / SG: x / pH: x  Gluc: 202 mg/dL / Ketone: x  / Bili: x / Urobili: x   Blood: x / Protein: x / Nitrite: x   Leuk Esterase: x / RBC: x / WBC x   Sq Epi: x / Non Sq Epi: x / Bacteria: x      CAPILLARY BLOOD GLUCOSE      POCT Blood Glucose.: 209 mg/dL (30 Nov 2024 22:55)  POCT Blood Glucose.: 214 mg/dL (30 Nov 2024 17:42)  POCT Blood Glucose.: 323 mg/dL (30 Nov 2024 13:51)  POCT Blood Glucose.: 213 mg/dL (30 Nov 2024 09:24)        Urinalysis Basic - ( 30 Nov 2024 05:30 )    Color: x / Appearance: x / SG: x / pH: x  Gluc: 202 mg/dL / Ketone: x  / Bili: x / Urobili: x   Blood: x / Protein: x / Nitrite: x   Leuk Esterase: x / RBC: x / WBC x   Sq Epi: x / Non Sq Epi: x / Bacteria: x        MEDICATIONS  (STANDING):  ascorbic acid 500 milliGRAM(s) Oral two times a day  cholecalciferol 1000 Unit(s) Oral daily  collagenase Ointment 1 Application(s) Topical daily  cyanocobalamin 1000 MICROGram(s) Oral daily  dextrose 5%. 1000 milliLiter(s) (100 mL/Hr) IV Continuous <Continuous>  dextrose 5%. 1000 milliLiter(s) (50 mL/Hr) IV Continuous <Continuous>  dextrose 50% Injectable 25 Gram(s) IV Push once  dextrose 50% Injectable 12.5 Gram(s) IV Push once  dextrose 50% Injectable 25 Gram(s) IV Push once  ertapenem  IVPB      ertapenem  IVPB 1000 milliGRAM(s) IV Intermittent every 24 hours  glucagon  Injectable 1 milliGRAM(s) IntraMuscular once  insulin glargine Injectable (LANTUS) 5 Unit(s) SubCutaneous at bedtime  insulin lispro (ADMELOG) corrective regimen sliding scale   SubCutaneous three times a day before meals  insulin lispro (ADMELOG) corrective regimen sliding scale   SubCutaneous at bedtime  lactated ringers. 1000 milliLiter(s) (75 mL/Hr) IV Continuous <Continuous>  metoprolol succinate ER 25 milliGRAM(s) Oral every 24 hours  multivitamin 1 Tablet(s) Oral daily  pantoprazole    Tablet 40 milliGRAM(s) Oral before breakfast  rivaroxaban 20 milliGRAM(s) Oral with dinner  thiamine 100 milliGRAM(s) Oral daily  zinc sulfate 220 milliGRAM(s) Oral daily    MEDICATIONS  (PRN):  acetaminophen     Tablet .. 650 milliGRAM(s) Oral every 6 hours PRN Temp greater or equal to 38C (100.4F), Mild Pain (1 - 3)  aluminum hydroxide/magnesium hydroxide/simethicone Suspension 30 milliLiter(s) Oral every 4 hours PRN Dyspepsia  dextrose Oral Gel 15 Gram(s) Oral once PRN Blood Glucose LESS THAN 70 milliGRAM(s)/deciliter  melatonin 3 milliGRAM(s) Oral at bedtime PRN Insomnia  ondansetron Injectable 4 milliGRAM(s) IV Push every 8 hours PRN Nausea and/or Vomiting      RADIOLOGY & ADDITIONAL TESTS: Reviewed   Patient is a 79y old  Male who presents with a chief complaint of Severe sepsis (30 Nov 2024 14:08)    OVERNIGHT EVENTS: [place]    SUBJECTIVE: seen at bedside this am, no acute complaints. he is still participating in verbal discussion, but is more awake and alert compared to prior days. he was awake watching TV    ROS: otherwise negative      T(C): 37.7 (11-30-24 @ 16:11), Max: 37.7 (11-30-24 @ 16:11)  HR: 91 (11-30-24 @ 16:11) (77 - 91)  BP: 130/81 (11-30-24 @ 16:11) (124/74 - 130/81)  RR: 17 (11-30-24 @ 16:11) (17 - 18)  SpO2: 97% (11-30-24 @ 16:11) (97% - 97%)  Wt(kg): --Vital Signs Last 24 Hrs  T(C): 37.7 (30 Nov 2024 16:11), Max: 37.7 (30 Nov 2024 16:11)  T(F): 99.8 (30 Nov 2024 16:11), Max: 99.8 (30 Nov 2024 16:11)  HR: 91 (30 Nov 2024 16:11) (77 - 91)  BP: 130/81 (30 Nov 2024 16:11) (124/74 - 130/81)  BP(mean): 97 (30 Nov 2024 16:11) (90 - 97)  RR: 17 (30 Nov 2024 16:11) (17 - 18)  SpO2: 97% (30 Nov 2024 16:11) (97% - 97%)    Parameters below as of 30 Nov 2024 16:11  Patient On (Oxygen Delivery Method): room air        PHYSICAL EXAM:  Constitutional: resting comfortably in bed; NAD  Head: NC/AT  Eyes: PERRL, EOMI, anicteric sclera  ENT: no nasal discharge; MMM  Neck: supple; no JVD or thyromegaly  Respiratory: CTA B/L; no W/R/R, no retractions  Cardiac: +S1/S2; RRR; no M/R/G  Gastrointestinal: soft, NT/ND; no rebound or guarding; +BSx4  Extremities: WWP, no clubbing or cyanosis; no peripheral edema. Capillary refill <2 sec  Musculoskeletal: NROM x4; no joint swelling, tenderness or erythema  Vascular: 2+ radial, DP/PT pulses B/L  Dermatologic: skin warm, dry and intact; no rashes, wounds, or scars  Neurologic: AAOx2 but not talking too much, normal motor and sensation in all extremities    LABS:                        8.4    5.47  )-----------( 277      ( 30 Nov 2024 05:30 )             27.3     11-30    137  |  103  |  24[H]  ----------------------------<  202[H]  3.7   |  23  |  0.62    Ca    8.7      30 Nov 2024 05:30  Phos  2.1     11-30  Mg     1.8     11-30    TPro  6.6  /  Alb  3.1[L]  /  TBili  0.2  /  DBili  x   /  AST  7[L]  /  ALT  <5[L]  /  AlkPhos  148[H]  11-29    Iron 14, TIBC 148, %Sat 9, Ferritin 488/ 11-29-24 @ 07:24      Urinalysis Basic - ( 30 Nov 2024 05:30 )    Color: x / Appearance: x / SG: x / pH: x  Gluc: 202 mg/dL / Ketone: x  / Bili: x / Urobili: x   Blood: x / Protein: x / Nitrite: x   Leuk Esterase: x / RBC: x / WBC x   Sq Epi: x / Non Sq Epi: x / Bacteria: x      CAPILLARY BLOOD GLUCOSE      POCT Blood Glucose.: 209 mg/dL (30 Nov 2024 22:55)  POCT Blood Glucose.: 214 mg/dL (30 Nov 2024 17:42)  POCT Blood Glucose.: 323 mg/dL (30 Nov 2024 13:51)  POCT Blood Glucose.: 213 mg/dL (30 Nov 2024 09:24)        Urinalysis Basic - ( 30 Nov 2024 05:30 )    Color: x / Appearance: x / SG: x / pH: x  Gluc: 202 mg/dL / Ketone: x  / Bili: x / Urobili: x   Blood: x / Protein: x / Nitrite: x   Leuk Esterase: x / RBC: x / WBC x   Sq Epi: x / Non Sq Epi: x / Bacteria: x        MEDICATIONS  (STANDING):  ascorbic acid 500 milliGRAM(s) Oral two times a day  cholecalciferol 1000 Unit(s) Oral daily  collagenase Ointment 1 Application(s) Topical daily  cyanocobalamin 1000 MICROGram(s) Oral daily  dextrose 5%. 1000 milliLiter(s) (100 mL/Hr) IV Continuous <Continuous>  dextrose 5%. 1000 milliLiter(s) (50 mL/Hr) IV Continuous <Continuous>  dextrose 50% Injectable 25 Gram(s) IV Push once  dextrose 50% Injectable 12.5 Gram(s) IV Push once  dextrose 50% Injectable 25 Gram(s) IV Push once  ertapenem  IVPB      ertapenem  IVPB 1000 milliGRAM(s) IV Intermittent every 24 hours  glucagon  Injectable 1 milliGRAM(s) IntraMuscular once  insulin glargine Injectable (LANTUS) 5 Unit(s) SubCutaneous at bedtime  insulin lispro (ADMELOG) corrective regimen sliding scale   SubCutaneous three times a day before meals  insulin lispro (ADMELOG) corrective regimen sliding scale   SubCutaneous at bedtime  lactated ringers. 1000 milliLiter(s) (75 mL/Hr) IV Continuous <Continuous>  metoprolol succinate ER 25 milliGRAM(s) Oral every 24 hours  multivitamin 1 Tablet(s) Oral daily  pantoprazole    Tablet 40 milliGRAM(s) Oral before breakfast  rivaroxaban 20 milliGRAM(s) Oral with dinner  thiamine 100 milliGRAM(s) Oral daily  zinc sulfate 220 milliGRAM(s) Oral daily    MEDICATIONS  (PRN):  acetaminophen     Tablet .. 650 milliGRAM(s) Oral every 6 hours PRN Temp greater or equal to 38C (100.4F), Mild Pain (1 - 3)  aluminum hydroxide/magnesium hydroxide/simethicone Suspension 30 milliLiter(s) Oral every 4 hours PRN Dyspepsia  dextrose Oral Gel 15 Gram(s) Oral once PRN Blood Glucose LESS THAN 70 milliGRAM(s)/deciliter  melatonin 3 milliGRAM(s) Oral at bedtime PRN Insomnia  ondansetron Injectable 4 milliGRAM(s) IV Push every 8 hours PRN Nausea and/or Vomiting      RADIOLOGY & ADDITIONAL TESTS: Reviewed

## 2024-12-01 NOTE — PROGRESS NOTE ADULT - ASSESSMENT
Pt is a 78YO M with PMHx of Afib on Xarelto, HTN, Esophageal Cancer (in remission since early ), ?CVA,  Crohns, Chronic colitis, T2DM, recent femur fracture in Oct 2024, who was sent in from [facility] rehab (were he resided since Hip surgery in October) for lethargy/AMS, hypotension and tachycardic to 160s at [facility]. Per chart review, prior to the fall, pt lived alone and walked with walker. While at rehab he has been bed-bound and developed a sacral ulcer about 3 weeks ago and was started on oral abx Keflex on , unclear indication. In the ED pt noted for have T 100.4 and received Zosyn 3.375, vancomycin 1g, Bcx growing Proteus/ESBL/CTX-M resistance marker ID consulted for Abx recommendations, now on Ertapenem.      Micro/Lab findings:    UA neg   : RVP neg  : Blood cx x2 growing Proteus, ESBL/CTX-M resistance marker     Urine Strep/Legionella neg    StaphA PCR+, MRSA neg    Blood cx x2 growing Proteus Mirabilis ESBL    Imagin/28 CXR: small patchy opacities the largest of which is at the left lung base    CTH No acute pathology    CTAP: Left lower lobe pneumonia. In the medial aspect of the second portion of the duodenum, there are  few air pockets, which may represent air within the duodenal diverticulum or contained perforation. Skin thickening and subcutaneous fat stranding/small collection in the sacrum, consistent with sacral decubitus ulcer. No CT evidence of   osteomyelitis.  -  TTE no vegetations  CXR     #Severe Sepsis   #PNA  #Sacral ulcer  Pt presented with Severe sepsis s/p Vanc Zosyn in ED, then narrowed to CTX now Bcx growing Proteus/ESBL/CTX-M resistance marker. Pt afebrile, HDS, WBC wnl. Recommended starting Ertapenem and f/u culture specification and sensitivies. If cx grows PsA, will need to adjust Abx. Repeat Blood cx x2 on  +Proteus mirabilis. Per surgical evaluation, sacral decubitus ulcer is unlikely the source of infection at this time and given debridement would require holding Xarelto/may not improve his clinical picture daughter defers debridement at this time.     - c/w Ertapenem 1g q24h   - Please obtain surveillance cultures today      ID Team 1 will follow  Pt is a 80YO M with PMHx of Afib on Xarelto, HTN, Esophageal Cancer (in remission since early ), ?CVA,  Crohns, Chronic colitis, T2DM, recent femur fracture in Oct 2024, who was sent in from [facility] rehab (were he resided since Hip surgery in October) for lethargy/AMS, hypotension and tachycardic to 160s at [facility]. Per chart review, prior to the fall, pt lived alone and walked with walker. While at rehab he has been bed-bound and developed a sacral ulcer about 3 weeks ago and was started on oral abx Keflex on , unclear indication. In the ED pt noted for have T 100.4 and received Zosyn 3.375, vancomycin 1g, Bcx growing Proteus/ESBL/CTX-M resistance marker ID consulted for Abx recommendations, now on Ertapenem.      Micro/Lab findings:    UA neg   : RVP neg  : Blood cx x2 growing Proteus, ESBL/CTX-M resistance marker     Urine Strep/Legionella neg    StaphA PCR+, MRSA neg    Blood cx x2 growing Proteus Mirabilis ESBL    Imagin/28 CXR: small patchy opacities the largest of which is at the left lung base    CTH No acute pathology    CTAP: Left lower lobe pneumonia. In the medial aspect of the second portion of the duodenum, there are  few air pockets, which may represent air within the duodenal diverticulum or contained perforation. Skin thickening and subcutaneous fat stranding/small collection in the sacrum, consistent with sacral decubitus ulcer. No CT evidence of   osteomyelitis.   TTE no vegetations     #Severe Sepsis   #PNA  #Sacral ulcer  Pt presented with Severe sepsis s/p Vanc Zosyn in ED, then narrowed to CTX now Bcx growing Proteus/ESBL/CTX-M resistance marker. Pt afebrile, HDS, WBC wnl. Recommended starting Ertapenem and f/u culture specification and sensitivies. If cx grows PsA, will need to adjust Abx. Repeat Blood cx x2 on  +Proteus mirabilis. Per surgical evaluation, sacral decubitus ulcer is unlikely the source of infection at this time and given debridement would require holding Xarelto/may not improve his clinical picture daughter defers debridement at this time.     - c/w Ertapenem 1g q24h   - Please obtain surveillance cultures today      ID Team 1 will follow

## 2024-12-01 NOTE — PROGRESS NOTE ADULT - SUBJECTIVE AND OBJECTIVE BOX
INTERVAL HPI/OVERNIGHT EVENTS: JULIO CESAR    SUBJECTIVE: Patient seen and examined at bedside. He is sleeping, but responds to sternal rub and noxious stimuli, opens eyes briefly, and grunts when asked questions during interview. Unable to assess ROS (i.e. cough, wheezing, chest pain, N/V/D, abdominal pain, urinary frequency/urgency/dysuria) given mentation.      ANTIBIOTICS/RELEVANT/ MEDICATIONS  (STANDING):  ascorbic acid 500 milliGRAM(s) Oral two times a day  cholecalciferol 1000 Unit(s) Oral daily  collagenase Ointment 1 Application(s) Topical daily  cyanocobalamin 1000 MICROGram(s) Oral daily  dextrose 5%. 1000 milliLiter(s) (50 mL/Hr) IV Continuous <Continuous>  dextrose 5%. 1000 milliLiter(s) (100 mL/Hr) IV Continuous <Continuous>  dextrose 50% Injectable 25 Gram(s) IV Push once  dextrose 50% Injectable 12.5 Gram(s) IV Push once  dextrose 50% Injectable 25 Gram(s) IV Push once  ertapenem  IVPB      ertapenem  IVPB 1000 milliGRAM(s) IV Intermittent every 24 hours  glucagon  Injectable 1 milliGRAM(s) IntraMuscular once  insulin glargine Injectable (LANTUS) 5 Unit(s) SubCutaneous at bedtime  insulin lispro (ADMELOG) corrective regimen sliding scale   SubCutaneous three times a day before meals  insulin lispro (ADMELOG) corrective regimen sliding scale   SubCutaneous at bedtime  magnesium sulfate  IVPB 2 Gram(s) IV Intermittent once  metoprolol succinate ER 25 milliGRAM(s) Oral every 24 hours  multivitamin 1 Tablet(s) Oral daily  pantoprazole    Tablet 40 milliGRAM(s) Oral before breakfast  rivaroxaban 20 milliGRAM(s) Oral with dinner  thiamine 100 milliGRAM(s) Oral daily  zinc sulfate 220 milliGRAM(s) Oral daily    MEDICATIONS  (PRN):  acetaminophen     Tablet .. 650 milliGRAM(s) Oral every 6 hours PRN Temp greater or equal to 38C (100.4F), Mild Pain (1 - 3)  aluminum hydroxide/magnesium hydroxide/simethicone Suspension 30 milliLiter(s) Oral every 4 hours PRN Dyspepsia  dextrose Oral Gel 15 Gram(s) Oral once PRN Blood Glucose LESS THAN 70 milliGRAM(s)/deciliter  melatonin 3 milliGRAM(s) Oral at bedtime PRN Insomnia  ondansetron Injectable 4 milliGRAM(s) IV Push every 8 hours PRN Nausea and/or Vomiting        Vital Signs Last 24 Hrs  T(C): 36.3 (01 Dec 2024 08:07), Max: 37.7 (30 Nov 2024 16:11)  T(F): 97.4 (01 Dec 2024 08:07), Max: 99.8 (30 Nov 2024 16:11)  HR: 83 (01 Dec 2024 08:07) (83 - 91)  BP: 134/78 (01 Dec 2024 08:07) (130/81 - 137/83)  BP(mean): 97 (01 Dec 2024 08:07) (97 - 97)  ABP: --  ABP(mean): --  RR: 18 (01 Dec 2024 08:07) (17 - 18)  SpO2: 98% (01 Dec 2024 08:07) (97% - 98%)    O2 Parameters below as of 01 Dec 2024 08:07  Patient On (Oxygen Delivery Method): room air            PHYSICAL EXAM:  Constitutional: Lethargic though responds to sternal rub  HEENT: Eyes open to verbal stimuli however unable to participate in exam. Dry MM  Pulm: No respiratory distress, nonlabored breathing, on room air  Cardiovascular: +S1/S2, RRR. No murmurs, rubs, or gallops.  Gastrointestinal: soft, ND. Bowel sounds present  Extremities: No edema  Derm: unstageable sacral ulcer   CNS: lethargic but arousable to loud verbal stimuli and can open eyes      LABS:                                   8.5    7.46  )-----------( 288      ( 01 Dec 2024 05:30 )             27.0     12-01    140  |  109[H]  |  22  ----------------------------<  210[H]  4.0   |  24  |  0.63    Ca    8.5      01 Dec 2024 05:30  Phos  2.7     12-01  Mg     1.5     12-01        Urinalysis Basic - ( 01 Dec 2024 05:30 )    Color: x / Appearance: x / SG: x / pH: x  Gluc: 210 mg/dL / Ketone: x  / Bili: x / Urobili: x   Blood: x / Protein: x / Nitrite: x   Leuk Esterase: x / RBC: x / WBC x   Sq Epi: x / Non Sq Epi: x / Bacteria: x      MICROBIOLOGY:    Culture - Blood (collected 29 Nov 2024 11:46)  Source: .Blood BLOOD  Gram Stain (30 Nov 2024 22:29):    Growth in anaerobic bottle: Gram Negative Rods  Preliminary Report (01 Dec 2024 13:38):    Growth in anaerobic bottle: Proteus mirabilis ESBL    See previous culture [id number]    Culture - Blood (collected 28 Nov 2024 13:14)  Source: .Blood BLOOD  Gram Stain (29 Nov 2024 11:05):    Growth in aerobic bottle: Gram Negative Rods    Growth in anaerobic bottle: Gram Negative Rods  Final Report (01 Dec 2024 10:01):    Growth in aerobic and anaerobic bottles: Proteus mirabilis ESBL    See previous culture [id number]    Culture - Blood (collected 28 Nov 2024 13:14)  Source: .Blood BLOOD  Gram Stain (29 Nov 2024 11:52):    Growth in aerobic bottle: Gram Negative Rods    Growth in anaerobic bottle: Gram Negative Rods  Final Report (01 Dec 2024 10:00):    Growth in aerobic and anaerobic bottles: Proteus mirabilis ESBL    Direct identification is available within approximately 3-5    hours either by Blood Panel Multiplexed PCR or Direct    MALDI-TOF. Details: [email or web address]  Organism: Blood Culture PCR  Proteus mirabilis ESBL (01 Dec 2024 10:00)  Organism: Proteus mirabilis ESBL (01 Dec 2024 10:00)  Organism: Blood Culture PCR (01 Dec 2024 10:00)    Urinalysis with Rflx Culture (collected 28 Nov 2024 11:50)          RADIOLOGY & ADDITIONAL STUDIES:

## 2024-12-02 ENCOUNTER — TRANSCRIPTION ENCOUNTER (OUTPATIENT)
Age: 79
End: 2024-12-02

## 2024-12-02 DIAGNOSIS — E43 UNSPECIFIED SEVERE PROTEIN-CALORIE MALNUTRITION: ICD-10-CM

## 2024-12-02 LAB
ANION GAP SERPL CALC-SCNC: 8 MMOL/L — SIGNIFICANT CHANGE UP (ref 5–17)
BUN SERPL-MCNC: 19 MG/DL — SIGNIFICANT CHANGE UP (ref 7–23)
CALCIUM SERPL-MCNC: 8.4 MG/DL — SIGNIFICANT CHANGE UP (ref 8.4–10.5)
CHLORIDE SERPL-SCNC: 110 MMOL/L — HIGH (ref 96–108)
CO2 SERPL-SCNC: 27 MMOL/L — SIGNIFICANT CHANGE UP (ref 22–31)
CREAT SERPL-MCNC: 0.56 MG/DL — SIGNIFICANT CHANGE UP (ref 0.5–1.3)
EGFR: 101 ML/MIN/1.73M2 — SIGNIFICANT CHANGE UP
GLUCOSE SERPL-MCNC: 181 MG/DL — HIGH (ref 70–99)
HCT VFR BLD CALC: 28.2 % — LOW (ref 39–50)
HGB BLD-MCNC: 8.8 G/DL — LOW (ref 13–17)
MAGNESIUM SERPL-MCNC: 1.6 MG/DL — SIGNIFICANT CHANGE UP (ref 1.6–2.6)
MCHC RBC-ENTMCNC: 28.7 PG — SIGNIFICANT CHANGE UP (ref 27–34)
MCHC RBC-ENTMCNC: 31.2 G/DL — LOW (ref 32–36)
MCV RBC AUTO: 91.9 FL — SIGNIFICANT CHANGE UP (ref 80–100)
NRBC # BLD: 0 /100 WBCS — SIGNIFICANT CHANGE UP (ref 0–0)
PHOSPHATE SERPL-MCNC: 2.5 MG/DL — SIGNIFICANT CHANGE UP (ref 2.5–4.5)
PLATELET # BLD AUTO: 281 K/UL — SIGNIFICANT CHANGE UP (ref 150–400)
POTASSIUM SERPL-MCNC: 4.2 MMOL/L — SIGNIFICANT CHANGE UP (ref 3.5–5.3)
POTASSIUM SERPL-SCNC: 4.2 MMOL/L — SIGNIFICANT CHANGE UP (ref 3.5–5.3)
RBC # BLD: 3.07 M/UL — LOW (ref 4.2–5.8)
RBC # FLD: 14.6 % — HIGH (ref 10.3–14.5)
SODIUM SERPL-SCNC: 145 MMOL/L — SIGNIFICANT CHANGE UP (ref 135–145)
WBC # BLD: 5.16 K/UL — SIGNIFICANT CHANGE UP (ref 3.8–10.5)
WBC # FLD AUTO: 5.16 K/UL — SIGNIFICANT CHANGE UP (ref 3.8–10.5)

## 2024-12-02 PROCEDURE — 99233 SBSQ HOSP IP/OBS HIGH 50: CPT | Mod: GC

## 2024-12-02 PROCEDURE — 99232 SBSQ HOSP IP/OBS MODERATE 35: CPT | Mod: GC

## 2024-12-02 RX ORDER — ALOGLIPTIN 12.5 MG/1
1 TABLET, FILM COATED ORAL
Refills: 0 | DISCHARGE

## 2024-12-02 RX ORDER — MIRTAZAPINE 15 MG/1
1 TABLET, FILM COATED ORAL
Refills: 0 | DISCHARGE

## 2024-12-02 RX ORDER — FERROUS SULFATE 325(65) MG
1 TABLET ORAL
Refills: 0 | DISCHARGE

## 2024-12-02 RX ORDER — INSULIN GLARGINE 100 [IU]/ML
5 INJECTION, SOLUTION SUBCUTANEOUS
Refills: 0 | DISCHARGE

## 2024-12-02 RX ORDER — RIVAROXABAN 10 MG/1
1 TABLET, FILM COATED ORAL
Refills: 0 | DISCHARGE

## 2024-12-02 RX ORDER — METOPROLOL TARTRATE 100 MG/1
1 TABLET, FILM COATED ORAL
Refills: 0 | DISCHARGE

## 2024-12-02 RX ORDER — CHOLECALCIFEROL (VITAMIN D3) 10MCG/0.25
1 DROPS ORAL
Refills: 0 | DISCHARGE

## 2024-12-02 RX ORDER — COLLAGENASE SANTYL 250 [ARB'U]/G
1 OINTMENT TOPICAL
Qty: 0 | Refills: 0 | DISCHARGE
Start: 2024-12-02

## 2024-12-02 RX ORDER — INSULIN GLARGINE 100 [IU]/ML
8 INJECTION, SOLUTION SUBCUTANEOUS AT BEDTIME
Refills: 0 | Status: DISCONTINUED | OUTPATIENT
Start: 2024-12-02 | End: 2024-12-04

## 2024-12-02 RX ORDER — LOSARTAN POTASSIUM 100 MG/1
1 TABLET, FILM COATED ORAL
Refills: 0 | DISCHARGE

## 2024-12-02 RX ORDER — INSULIN GLARGINE 100 [IU]/ML
7 INJECTION, SOLUTION SUBCUTANEOUS AT BEDTIME
Refills: 0 | Status: DISCONTINUED | OUTPATIENT
Start: 2024-12-02 | End: 2024-12-02

## 2024-12-02 RX ORDER — HEPARIN SODIUM 5000 [USP'U]/.5ML
15 INJECTION, SOLUTION INTRAVENOUS; SUBCUTANEOUS ONCE
Refills: 0 | Status: COMPLETED | OUTPATIENT
Start: 2024-12-02 | End: 2024-12-02

## 2024-12-02 RX ORDER — OMEPRAZOLE 20 MG/1
1 CAPSULE, DELAYED RELEASE ORAL
Refills: 0 | DISCHARGE

## 2024-12-02 RX ADMIN — COLLAGENASE SANTYL 1 APPLICATION(S): 250 OINTMENT TOPICAL at 12:13

## 2024-12-02 RX ADMIN — METOPROLOL TARTRATE 25 MILLIGRAM(S): 100 TABLET, FILM COATED ORAL at 18:21

## 2024-12-02 RX ADMIN — RIVAROXABAN 20 MILLIGRAM(S): 10 TABLET, FILM COATED ORAL at 18:11

## 2024-12-02 RX ADMIN — INSULIN GLARGINE 8 UNIT(S): 100 INJECTION, SOLUTION SUBCUTANEOUS at 22:37

## 2024-12-02 RX ADMIN — Medication 1000 UNIT(S): at 12:16

## 2024-12-02 RX ADMIN — Medication 500 MILLIGRAM(S): at 05:53

## 2024-12-02 RX ADMIN — Medication 1 TABLET(S): at 12:17

## 2024-12-02 RX ADMIN — Medication 4: at 13:16

## 2024-12-02 RX ADMIN — ERTAPENEM 120 MILLIGRAM(S): 1 INJECTION, POWDER, LYOPHILIZED, FOR SOLUTION INTRAMUSCULAR; INTRAVENOUS at 18:11

## 2024-12-02 RX ADMIN — Medication 25 GRAM(S): at 12:16

## 2024-12-02 RX ADMIN — Medication 4: at 09:52

## 2024-12-02 RX ADMIN — Medication 1000 MICROGRAM(S): at 12:17

## 2024-12-02 RX ADMIN — Medication 100 MILLIGRAM(S): at 12:17

## 2024-12-02 RX ADMIN — HEPARIN SODIUM 62.5 MILLIMOLE(S): 5000 INJECTION, SOLUTION INTRAVENOUS; SUBCUTANEOUS at 15:50

## 2024-12-02 RX ADMIN — Medication 500 MILLIGRAM(S): at 18:10

## 2024-12-02 RX ADMIN — Medication 220 MILLIGRAM(S): at 12:17

## 2024-12-02 RX ADMIN — PANTOPRAZOLE SODIUM 40 MILLIGRAM(S): 40 TABLET, DELAYED RELEASE ORAL at 09:51

## 2024-12-02 NOTE — DISCHARGE NOTE PROVIDER - NSDCCPCAREPLAN_GEN_ALL_CORE_FT
PRINCIPAL DISCHARGE DIAGNOSIS  Diagnosis: Bacteremia  Assessment and Plan of Treatment: Bacteremia is the presence of bacteria in the blood. When bacteria enter the bloodstream, they can cause a life-threatening reaction called sepsis. Sepsis is a medical emergency. During your hospitalization, you were found to have a bacterial infection in your blood for which you recieved antibiotics. You also had pneumonia and an ulcer on your buttocks. You were seen by the surgeons here who recommended that you needed general wound care for you ulcer.

## 2024-12-02 NOTE — PROGRESS NOTE ADULT - SUBJECTIVE AND OBJECTIVE BOX
INTERVAL HPI/OVERNIGHT EVENTS:    CONSTITUTIONAL:  Negative fever or chills, feels well, good appetite  EYES:  Negative  blurry vision or double vision  CARDIOVASCULAR:  Negative for chest pain or palpitations  RESPIRATORY:  Negative for cough, wheezing, or SOB   GASTROINTESTINAL:  Negative for nausea, vomiting, diarrhea, constipation, or abdominal pain  GENITOURINARY:  Negative frequency, urgency or dysuria  NEUROLOGIC:  No headache, confusion, dizziness, lightheadedness      ANTIBIOTICS/RELEVANT:    MEDICATIONS  (STANDING):  ascorbic acid 500 milliGRAM(s) Oral two times a day  cholecalciferol 1000 Unit(s) Oral daily  collagenase Ointment 1 Application(s) Topical daily  cyanocobalamin 1000 MICROGram(s) Oral daily  dextrose 5%. 1000 milliLiter(s) (50 mL/Hr) IV Continuous <Continuous>  dextrose 5%. 1000 milliLiter(s) (100 mL/Hr) IV Continuous <Continuous>  dextrose 50% Injectable 25 Gram(s) IV Push once  dextrose 50% Injectable 12.5 Gram(s) IV Push once  dextrose 50% Injectable 25 Gram(s) IV Push once  ertapenem  IVPB      ertapenem  IVPB 1000 milliGRAM(s) IV Intermittent every 24 hours  glucagon  Injectable 1 milliGRAM(s) IntraMuscular once  insulin glargine Injectable (LANTUS) 5 Unit(s) SubCutaneous at bedtime  insulin lispro (ADMELOG) corrective regimen sliding scale   SubCutaneous three times a day before meals  magnesium sulfate  IVPB 2 Gram(s) IV Intermittent once  metoprolol succinate ER 25 milliGRAM(s) Oral every 24 hours  multivitamin 1 Tablet(s) Oral daily  pantoprazole    Tablet 40 milliGRAM(s) Oral before breakfast  rivaroxaban 20 milliGRAM(s) Oral with dinner  thiamine 100 milliGRAM(s) Oral daily  zinc sulfate 220 milliGRAM(s) Oral daily    MEDICATIONS  (PRN):  acetaminophen     Tablet .. 650 milliGRAM(s) Oral every 6 hours PRN Temp greater or equal to 38C (100.4F), Mild Pain (1 - 3)  aluminum hydroxide/magnesium hydroxide/simethicone Suspension 30 milliLiter(s) Oral every 4 hours PRN Dyspepsia  dextrose Oral Gel 15 Gram(s) Oral once PRN Blood Glucose LESS THAN 70 milliGRAM(s)/deciliter  melatonin 3 milliGRAM(s) Oral at bedtime PRN Insomnia  ondansetron Injectable 4 milliGRAM(s) IV Push every 8 hours PRN Nausea and/or Vomiting        Vital Signs Last 24 Hrs  T(C): 36.5 (02 Dec 2024 05:31), Max: 36.5 (02 Dec 2024 05:31)  T(F): 97.7 (02 Dec 2024 05:31), Max: 97.7 (02 Dec 2024 05:31)  HR: 79 (02 Dec 2024 05:31) (79 - 86)  BP: 128/75 (02 Dec 2024 05:31) (126/79 - 134/78)  BP(mean): 97 (01 Dec 2024 08:07) (97 - 97)  RR: 18 (02 Dec 2024 05:31) (18 - 18)  SpO2: 99% (02 Dec 2024 05:31) (97% - 99%)    Parameters below as of 01 Dec 2024 20:44  Patient On (Oxygen Delivery Method): room air        12-01-24 @ 07:01  -  12-02-24 @ 07:00  --------------------------------------------------------  IN: 0 mL / OUT: 375 mL / NET: -375 mL      PHYSICAL EXAM:  Constitutional:Well-developed, well nourished  Eyes:VIC, EOMI  Ear/Nose/Throat: no oral lesion, no sinus tenderness on percussion	  Neck:no JVD, no lymphadenopathy, supple  Respiratory: CTA natalie  Cardiovascular: S1S2 RRR, no murmurs  Gastrointestinal:soft, (+) BS, no HSM  Extremities:no e/e/c  Vascular: DP Pulse:	right normal; left normal      LABS:                        8.8    5.16  )-----------( 281      ( 02 Dec 2024 05:30 )             28.2     12-02    145  |  x   |  19  ----------------------------<  181[H]  4.2   |  27  |  0.56    Ca    8.4      02 Dec 2024 05:30  Phos  2.5     12-02  Mg     1.6     12-02        Urinalysis Basic - ( 02 Dec 2024 05:30 )    Color: x / Appearance: x / SG: x / pH: x  Gluc: 181 mg/dL / Ketone: x  / Bili: x / Urobili: x   Blood: x / Protein: x / Nitrite: x   Leuk Esterase: x / RBC: x / WBC x   Sq Epi: x / Non Sq Epi: x / Bacteria: x        MICROBIOLOGY:    RADIOLOGY & ADDITIONAL STUDIES: INTERVAL HPI/OVERNIGHT EVENTS: afebrile, JOSE     Pt resting in bed, pt non verbal, awake with eyes tracking.     ANTIBIOTICS/RELEVANT:    MEDICATIONS  (STANDING):  ascorbic acid 500 milliGRAM(s) Oral two times a day  cholecalciferol 1000 Unit(s) Oral daily  collagenase Ointment 1 Application(s) Topical daily  cyanocobalamin 1000 MICROGram(s) Oral daily  dextrose 5%. 1000 milliLiter(s) (50 mL/Hr) IV Continuous <Continuous>  dextrose 5%. 1000 milliLiter(s) (100 mL/Hr) IV Continuous <Continuous>  dextrose 50% Injectable 25 Gram(s) IV Push once  dextrose 50% Injectable 12.5 Gram(s) IV Push once  dextrose 50% Injectable 25 Gram(s) IV Push once  ertapenem  IVPB      ertapenem  IVPB 1000 milliGRAM(s) IV Intermittent every 24 hours  glucagon  Injectable 1 milliGRAM(s) IntraMuscular once  insulin glargine Injectable (LANTUS) 5 Unit(s) SubCutaneous at bedtime  insulin lispro (ADMELOG) corrective regimen sliding scale   SubCutaneous three times a day before meals  magnesium sulfate  IVPB 2 Gram(s) IV Intermittent once  metoprolol succinate ER 25 milliGRAM(s) Oral every 24 hours  multivitamin 1 Tablet(s) Oral daily  pantoprazole    Tablet 40 milliGRAM(s) Oral before breakfast  rivaroxaban 20 milliGRAM(s) Oral with dinner  thiamine 100 milliGRAM(s) Oral daily  zinc sulfate 220 milliGRAM(s) Oral daily    MEDICATIONS  (PRN):  acetaminophen     Tablet .. 650 milliGRAM(s) Oral every 6 hours PRN Temp greater or equal to 38C (100.4F), Mild Pain (1 - 3)  aluminum hydroxide/magnesium hydroxide/simethicone Suspension 30 milliLiter(s) Oral every 4 hours PRN Dyspepsia  dextrose Oral Gel 15 Gram(s) Oral once PRN Blood Glucose LESS THAN 70 milliGRAM(s)/deciliter  melatonin 3 milliGRAM(s) Oral at bedtime PRN Insomnia  ondansetron Injectable 4 milliGRAM(s) IV Push every 8 hours PRN Nausea and/or Vomiting        Vital Signs Last 24 Hrs  T(C): 36.5 (02 Dec 2024 05:31), Max: 36.5 (02 Dec 2024 05:31)  T(F): 97.7 (02 Dec 2024 05:31), Max: 97.7 (02 Dec 2024 05:31)  HR: 79 (02 Dec 2024 05:31) (79 - 86)  BP: 128/75 (02 Dec 2024 05:31) (126/79 - 134/78)  BP(mean): 97 (01 Dec 2024 08:07) (97 - 97)  RR: 18 (02 Dec 2024 05:31) (18 - 18)  SpO2: 99% (02 Dec 2024 05:31) (97% - 99%)    Parameters below as of 01 Dec 2024 20:44  Patient On (Oxygen Delivery Method): room air        12-01-24 @ 07:01  -  12-02-24 @ 07:00  --------------------------------------------------------  IN: 0 mL / OUT: 375 mL / NET: -375 mL      PHYSICAL EXAM:  Constitutional: NAD resting in bed	  Pulm: No respiratory distress, nonlabored breathing, on room air  Cardiovascular: HDS  Gastrointestinal: soft, TN/ND  Extremities: No edema  Derm: No obvious rashes  CNS: unable to assess       LABS:                        8.8    5.16  )-----------( 281      ( 02 Dec 2024 05:30 )             28.2     12-02    145  |  x   |  19  ----------------------------<  181[H]  4.2   |  27  |  0.56    Ca    8.4      02 Dec 2024 05:30  Phos  2.5     12-02  Mg     1.6     12-02        Urinalysis Basic - ( 02 Dec 2024 05:30 )    Color: x / Appearance: x / SG: x / pH: x  Gluc: 181 mg/dL / Ketone: x  / Bili: x / Urobili: x   Blood: x / Protein: x / Nitrite: x   Leuk Esterase: x / RBC: x / WBC x   Sq Epi: x / Non Sq Epi: x / Bacteria: x        MICROBIOLOGY:    RADIOLOGY & ADDITIONAL STUDIES:

## 2024-12-02 NOTE — PROGRESS NOTE ADULT - ASSESSMENT
Pt is a 78YO M with PMHx of Afib on Xarelto, HTN, Esophageal Cancer (in remission since early ), ?CVA,  Crohns, Chronic colitis, T2DM, recent femur fracture in Oct 2024, who was sent in from [facility] rehab (were he resided since Hip surgery in October) for lethargy/AMS, hypotension and tachycardic to 160s at [facility]. Per chart review, prior to the fall, pt lived alone and walked with walker. While at rehab he has been bed-bound and developed a sacral ulcer about 3 weeks ago and was started on oral abx Keflex on , unclear indication. In the ED pt noted for have T 100.4 and received Zosyn 3.375, vancomycin 1g, Bcx growing Proteus/ESBL/CTX-M resistance marker ID consulted for Abx recommendations, now on Ertapenem.      Micro/Lab findings:    UA neg   : RVP neg  : Blood cx x2 growing Proteus, ESBL/CTX-M resistance marker     Urine Strep/Legionella neg    StaphA PCR+, MRSA neg    Blood cx x2 growing Proteus Mirabilis ESBL    Imagin/28 CXR: small patchy opacities the largest of which is at the left lung base    CTH No acute pathology    CTAP: Left lower lobe pneumonia. In the medial aspect of the second portion of the duodenum, there are  few air pockets, which may represent air within the duodenal diverticulum or contained perforation. Skin thickening and subcutaneous fat stranding/small collection in the sacrum, consistent with sacral decubitus ulcer. No CT evidence of   osteomyelitis.   TTE no vegetations     #Severe Sepsis   #PNA  #Sacral ulcer  Pt presented with Severe sepsis s/p Vanc Zosyn in ED, then narrowed to CTX now Bcx growing Proteus/ESBL/CTX-M resistance marker. Pt afebrile, HDS, WBC wnl. Recommended starting Ertapenem and f/u culture specification and sensitivies. If cx grows PsA, will need to adjust Abx. Repeat Blood cx x2 on  +Proteus mirabilis. Per surgical evaluation, sacral decubitus ulcer is unlikely the source of infection at this time and given debridement would require holding Xarelto/may not improve his clinical picture daughter defers debridement at this time.     - c/w Ertapenem 1g q24h   - f/u surveillance cultures       ID Team 1 will follow  Pt is a 80YO M with PMHx of Afib on Xarelto, HTN, Esophageal Cancer (in remission since early ), ?CVA,  Crohns, Chronic colitis, T2DM, recent femur fracture in Oct 2024, who was sent in from [facility] rehab (were he resided since Hip surgery in October) for lethargy/AMS, hypotension and tachycardic to 160s at [facility]. Per chart review, prior to the fall, pt lived alone and walked with walker. While at rehab he has been bed-bound and developed a sacral ulcer about 3 weeks ago and was started on oral abx Keflex on , unclear indication. In the ED pt noted for have T 100.4 and received Zosyn 3.375, vancomycin 1g, Bcx growing Proteus/ESBL/CTX-M resistance marker ID consulted for Abx recommendations, now on Ertapenem.      Micro/Lab findings:    UA neg   : RVP neg  : Blood cx x2 growing Proteus, ESBL/CTX-M resistance marker     Urine Strep/Legionella neg    StaphA PCR+, MRSA neg    Blood cx x2 growing Proteus Mirabilis ESBL    Imagin/28 CXR: small patchy opacities the largest of which is at the left lung base    CTH No acute pathology    CTAP: Left lower lobe pneumonia. In the medial aspect of the second portion of the duodenum, there are  few air pockets, which may represent air within the duodenal diverticulum or contained perforation. Skin thickening and subcutaneous fat stranding/small collection in the sacrum, consistent with sacral decubitus ulcer. No CT evidence of   osteomyelitis.   TTE no vegetations     #Severe Sepsis   #PNA  #Sacral ulcer  Pt presented with Severe sepsis s/p Vanc Zosyn in ED, then narrowed to CTX now Bcx growing Proteus/ESBL/CTX-M resistance marker. Pt afebrile, HDS, WBC wnl. Recommended starting Ertapenem and f/u culture specification and sensitivies. If cx grows PsA, will need to adjust Abx. Repeat Blood cx x2 on  +Proteus mirabilis. Given CT findings with possible collections would favor 14 day course of IV Abx with follow up repeat imaging.     - c/w Ertapenem 1g q24h (Likely at least 2 week course of IV Abx with repeat imaging)   - f/u surveillance cultures   - Final recs pending surveillance cultures with NGTD at 48h      ID Team 1 will follow

## 2024-12-02 NOTE — DISCHARGE NOTE PROVIDER - CARE PROVIDERS DIRECT ADDRESSES
[email or web address]

## 2024-12-02 NOTE — PROGRESS NOTE ADULT - PROBLEM SELECTOR PLAN 1
Patient presented with severe sepsis: Fever 100.4, , and elevated Lactate as well as lethargy. Source likely  PNA given L lower lobe opacity  vs Sacral ulcer   CT showed: Left lower lobe pneumonia. Skin thickening and subcutaneous fat stranding/small collection in the sacrum, consistent with sacral decubitus ulcer. No CT evidence of osteomyelitis.  Currently satting well in RA. Sacral Ulcer noted with foul smell purulent discharge     Plan:   - BCx with 2/2 GNR, proteus, ESBL, but most recent cultures NGTD from 11/30, need to follow up yesterday's cultures  - Currently on Ertapenem  - ID consulted  - Wound care for unstageable sacral wound  - surgery recs regarding possible drain of sacral ulcer collection > no acute surgical intervention per surgery, but needs general wound care

## 2024-12-02 NOTE — DISCHARGE NOTE PROVIDER - ATTENDING DISCHARGE PHYSICAL EXAMINATION:
Gen: NAD, frail, resting in bed comfortably   HEENT: EOMI, PERRL, no scleral icterus  CV: normal S1 and S2  Lungs: CTABL, normal respiratory effort on RA  Ab: soft, NT, ND, normal BS  Ext: no LE edema   Neuro: A&O x 3, no focal deficits     79-year-old male with a PMHx of Afib (on Xarelto), HTN, esophageal neoplasm (s/p partial esophagectomy), Crohns disease, DMII and recent femur fracture (10/24) c/b sacral ulcer who presented with metabolic encephalopathy, found to have severe sepsis 2/2 gram-negative pneumonia c/b ESBL Proteus bacteremia. BCx (11/28 and 11/29): ESBL Proteus. BCx (11/30 and 12/1): NGTD. TTE without evidence of vegetations.        Plan:      -ID consulted, continue with Ertapenem x 7-days (end date: 12/6) via pIV at [facility]  -ACS consulted, no plan for surgical intervention, sacral ulcer is unlikely source of sepsis   -wound care consulted, appreciate recommendations     -PT recommends [facility]       Rest of plan as outlined above.

## 2024-12-02 NOTE — DISCHARGE NOTE PROVIDER - CARE PROVIDER_API CALL
Allyn Alexis  Infectious Disease  [address] [address]  Phone: [phone]  Fax: [phone]  Follow Up Time: 2 weeks

## 2024-12-02 NOTE — DISCHARGE NOTE PROVIDER - HOSPITAL COURSE
#Discharge: do not delete    79 M hx of afib on xarelto, htn, esophageal neoplasm, crohns, T2DM, sacral ulcer admitted with multifactorial infection, ESBL proteus bacteremia, left lobe PNA, sacral ulcer, treated and improved on ertapenem.     Hospital course (by problem):     #Infection, sepsis, bacteremia  - Patient presented with severe sepsis: Fever 100.4, , and elevated Lactate as well as lethargy, infection source likely multifactorial, as ended up having ESBL Proteus susceptible to ertapenem, left lower lobe pneumonia but without respiratory compromise with normal saturation on room air, and a possibly infected nonpurulent unstageable sacral wound without imaging evidence of osteomyelitis with no acute surgical intervention per surgery consult, received wound care. Was initially on vanc, zosy, but ID was consulted and switched to ertapenem and improved on that, specifically improvement in lethargy, fever, vitals. Had successive blood cultures after proteus ESBL which had no growth.    Plan  - continue with ABX _______________ via PICC line  - wound care  - Sacrum: soak wound with vashe for 5 minutes. Apply thick layer of Collagenase, cover with vashe moist gauze and foam. Change daily.   - Right lateral malleolus: swab with Cavilon, leave open to air. Reapply daily    #Metabolic encephalopathy  - he arrived very lethargic, but this improved significantly with antibiotic treatment. baseline mental status per family______. His original lethargy was secondary to his infectious processes.     #Atrial fibrillation  - Arrived with sinus tachycardia to 160s, but resolved quickly in ED. Continued with home metoprolol succinate 25 qd and Xarelto 20 qd        Patient was discharged to: (home/[facility]/acute rehab/hospice, etc, and with what services – home health PT/RN? Home O2?)    New medications:   Changes to old medications:  Medications that were stopped:    Items to follow up as outpatient:    Physical exam at the time of discharge:    Constitutional: resting comfortably in bed; NAD  Head: NC/AT  Eyes: PERRL, EOMI, anicteric sclera  ENT: no nasal discharge; MMM  Neck: supple; no JVD or thyromegaly  Respiratory: CTA B/L; no W/R/R, no retractions  Cardiac: +S1/S2; RRR; no M/R/G  Gastrointestinal: abdomen soft, NT/ND; no rebound or guarding; +BSx4  Back: spine midline, no bony tenderness or step-offs; no CVAT B/L  Extremities: WWP, no clubbing or cyanosis; no peripheral edema  Musculoskeletal: NROM x4; no joint swelling, tenderness or erythema  Vascular: 2+ radial, DP/PT pulses B/L  Dermatologic: skin warm, dry and intact; no rashes, wounds, or scars  Lymphatic: no submandibular or cervical LAD  Neurologic: AAOx3; CNII-XII grossly intact; no focal deficits  Psychiatric: affect and characteristics of appearance, verbalizations, behaviors are appropriate #Discharge: do not delete    79 M hx of afib on xarelto, htn, esophageal neoplasm, crohns, T2DM, sacral ulcer admitted with multifactorial infection, ESBL proteus bacteremia, left lobe PNA, sacral ulcer, treated and improved on ertapenem.     Hospital course (by problem):     #Infection, sepsis, bacteremia  - Patient presented with severe sepsis: Fever 100.4, , and elevated Lactate as well as lethargy, infection source likely multifactorial, as ended up having ESBL Proteus susceptible to ertapenem, left lower lobe pneumonia but without respiratory compromise with normal saturation on room air, and a possibly infected nonpurulent unstageable sacral wound without imaging evidence of osteomyelitis with no acute surgical intervention per surgery consult, received wound care. Was initially on vanc, zosy, but ID was consulted and switched to ertapenem and improved on that, specifically improvement in lethargy, fever, vitals. Had successive blood cultures after proteus ESBL which had no growth.    Plan  - continue with ABX _______________ via PICC line  - wound care  - Sacrum: soak wound with vashe for 5 minutes. Apply thick layer of Collagenase, cover with vashe moist gauze and foam. Change daily.   - Right lateral malleolus: swab with Cavilon, leave open to air. Reapply daily    #Metabolic encephalopathy  - he arrived very lethargic, but this improved significantly with antibiotic treatment. baseline mental status per family______. His original lethargy was secondary to his infectious processes.     #Atrial fibrillation  - Arrived with sinus tachycardia to 160s, but resolved quickly in ED. Continued with home metoprolol succinate 25 qd and Xarelto 20 qd    #Duodenal diverticulum.   ·  Plan: On CT found to have: In the medial aspect of the second portion of the duodenum, there are few air pockets, which may represent air within the duodenal diverticulum or contained perforation  As per ED provider Surgery was consulted and recommended no surgical intervention     #Hiatal hernia  ·  Plan: CT showed: A portion of the pancreatic tail herniating into the intrathoracic  space via esophageal hiatus as well as intrathoracic stomach.   Home meds: Omeprazole 40 qd. c/w pantoprazole 40 (interchange)     #ATN (acute tubular necrosis).   ·  Plan: On admission: BUN 56, creatinine 0.99 with granular cast, theresa i/s/o sepsis   -Monitor BMP s/p fluids.    #Pancreatic lesion.   ·  Plan: CT showed: Ill-defined hypoattenuation of the pancreatic head.   Plan:   Consider MRI abdomen as outpatient.    #High anion gap metabolic acidosis.   ·  Plan: Bicarb 20, AG 17 likely i/s/o lactic acidosis   Plan:   monitor BMP.    Problem: HTN (hypertension).   ·  Plan; Home meds: Losartan 50 qd  -hold as normotensive, restart if needed.    Problem: DM (diabetes mellitus).   ·  Plan: Home meds: Alogliptin 25 qd, Metformin 1000 BID, Insulin Glargine 5U qhs  Plan:   -iss  -Hold oral agents  -Resume Glargine 5 units qhs.      Patient was discharged to: [facility]    New medications:   Changes to old medications: none  Medications that were stopped: none    Items to follow up as outpatient:    Physical exam at the time of discharge:    Constitutional: resting comfortably in bed; NAD  Head: NC/AT  Eyes: PERRL, EOMI, anicteric sclera  ENT: no nasal discharge; MMM  Neck: supple; no JVD or thyromegaly  Respiratory: CTA B/L; no W/R/R, no retractions  Cardiac: +S1/S2; RRR; no M/R/G  Gastrointestinal: abdomen soft, NT/ND; no rebound or guarding; +BSx4  Back: spine midline, no bony tenderness or step-offs; no CVAT B/L  Extremities: WWP, no clubbing or cyanosis; no peripheral edema  Musculoskeletal: NROM x4; no joint swelling, tenderness or erythema  Vascular: 2+ radial, DP/PT pulses B/L  Dermatologic: skin warm, dry and intact; no rashes, wounds, or scars  Lymphatic: no submandibular or cervical LAD  Neurologic: AAOx3; CNII-XII grossly intact; no focal deficits  Psychiatric: affect and characteristics of appearance, verbalizations, behaviors are appropriate #Discharge: do not delete    79 M hx of afib on xarelto, htn, dementia, bilateral deafness, esophageal neoplasm, crohns, T2DM, sacral ulcer admitted with multifactorial infection, ESBL proteus bacteremia, left lobe PNA, sacral ulcer, treated and improved on ertapenem.     Hospital course (by problem):     #Infection, sepsis, bacteremia  - Patient presented with severe sepsis: Fever 100.4, , and elevated Lactate as well as lethargy, infection source likely multifactorial, as ended up having ESBL Proteus susceptible to ertapenem, left lower lobe pneumonia but without respiratory compromise with normal saturation on room air, and a possibly infected nonpurulent unstageable sacral wound without imaging evidence of osteomyelitis with no acute surgical intervention per surgery consult, received wound care. Was initially on vanc, zosy, but ID was consulted and switched to ertapenem and improved on that, specifically improvement in lethargy, fever, vitals. Had successive blood cultures after the blood culture shwoing proteus ESBL and these ones had no growth. He clinically improved on ertapenem and remained HDS, afebrile, with resolution of his mental status back to baseline.     Plan  - Discharge patient with Ertapenem 1g q24h ( 11/30-12/6 )  - Please set up home infusion with [facility]  - Weekly labs: CMP, CBC w/ diff, ESR, CRP faxed to ID office  - Patient to follow up with Dr. Alexis  - wound care  - Sacrum: soak wound with vashe for 5 minutes. Apply thick layer of Collagenase, cover with vashe moist gauze and foam. Change daily.   - Right lateral malleolus: swab with Cavilon, leave open to air. Reapply daily    #Metabolic encephalopathy  - he arrived very lethargic, but this improved significantly with antibiotic treatment. baseline mental status per family______. His original lethargy was secondary to his infectious processes.     #Atrial fibrillation  - Arrived with sinus tachycardia to 160s, but resolved quickly in ED. Continued with home metoprolol succinate 25 qd and Xarelto 20 qd    #Duodenal diverticulum.   ·  Plan: On CT found to have: In the medial aspect of the second portion of the duodenum, there are few air pockets, which may represent air within the duodenal diverticulum or contained perforation  As per ED provider Surgery was consulted and recommended no surgical intervention     #Hiatal hernia  ·  Plan: CT showed: A portion of the pancreatic tail herniating into the intrathoracic  space via esophageal hiatus as well as intrathoracic stomach.   Home meds: Omeprazole 40 qd. c/w pantoprazole 40 (interchange)     #ATN (acute tubular necrosis).   ·  Plan: On admission: BUN 56, creatinine 0.99 with granular cast, likley i/s/o sepsis   -Monitor BMP s/p fluids.    #Pancreatic lesion.   ·  Plan: CT showed: Ill-defined hypoattenuation of the pancreatic head.   Plan:   Consider MRI abdomen as outpatient.    #High anion gap metabolic acidosis.   ·  Plan: Bicarb 20, AG 17 likely i/s/o lactic acidosis   Plan:   monitor BMP.    Problem: HTN (hypertension).   ·  Plan; Home meds: Losartan 50 qd  -hold as normotensive, restart if needed.    Problem: DM (diabetes mellitus).   ·  Plan: Home meds: Alogliptin 25 qd, Metformin 1000 BID, Insulin Glargine 5U qhs  Plan:   -iss  -Hold oral agents  -Resume Glargine 5 units qhs.      Patient was discharged to: [facility]    New medications: Ertapenem 1g q24h ( 11/30-12/6 )  Changes to old medications: none  Medications that were stopped: none    Items to follow up as outpatient: follow up with Dr. Alexis (infectious disease)    Physical exam at the time of discharge:  Constitutional: resting comfortably in bed; NAD  Head: NC/AT  Eyes: PERRL, EOMI, anicteric sclera  ENT: no nasal discharge; MMM  Neck: supple; no JVD or thyromegaly  Respiratory: CTA B/L; no W/R/R, no retractions  Cardiac: 3/6 systolic murmur most prominent in RSB but heard over precordium, normal RR  Gastrointestinal: abdomen soft, NT/ND; no rebound or guarding; +BSx4  Back: unstagable sacral ulcer without purulence, bleeding  Extremities: WWP, no clubbing or cyanosis; no peripheral edema  Musculoskeletal: NROM x4; no joint swelling, tenderness or erythema  Vascular: 2+ radial, DP/PT pulses B/L  Dermatologic: skin warm, dry and intact; no rashes, wounds, or scars  Lymphatic: no submandibular or cervical LAD  Neurologic: Alert and awake, tracks with eyes, squeezes hands, but minimally verbal (this is his baseline per family) #Discharge: do not delete    79 M hx of afib on xarelto, htn, dementia, bilateral deafness, esophageal neoplasm, crohns, T2DM, sacral ulcer admitted with multifactorial infection, ESBL proteus bacteremia, left lobe PNA, sacral ulcer, treated and improved on ertapenem.     Hospital course (by problem):     #Infection, sepsis, bacteremia  - Patient presented with severe sepsis: Fever 100.4, , and elevated Lactate as well as lethargy, infection source likely multifactorial, as ended up having ESBL Proteus susceptible to ertapenem, left lower lobe pneumonia but without respiratory compromise with normal saturation on room air, and a possibly infected nonpurulent unstageable sacral wound without imaging evidence of osteomyelitis with no acute surgical intervention per surgery consult, received wound care. Was initially on vanc, zosy, but ID was consulted and switched to ertapenem and improved on that, specifically improvement in lethargy, fever, vitals. Had successive blood cultures after the blood culture shwoing proteus ESBL and these ones had no growth. He clinically improved on ertapenem and remained HDS, afebrile, with resolution of his mental status back to baseline.     Plan  - Discharge patient with Ertapenem 1g q24h (11/30-12/6)  - Weekly labs: CMP, CBC w/ diff, ESR, CRP faxed to ID office  - Patient to follow up with Dr. Alexis  - wound care  - Sacrum: soak wound with vashe for 5 minutes. Apply thick layer of Collagenase, cover with vashe moist gauze and foam. Change daily.   - Right lateral malleolus: swab with Cavilon, leave open to air. Reapply daily    #Metabolic encephalopathy  - he arrived very lethargic, but this improved significantly with antibiotic treatment. baseline mental status per family______. His original lethargy was secondary to his infectious processes.     #Atrial fibrillation  - Arrived with sinus tachycardia to 160s, but resolved quickly in ED. Continued with home metoprolol succinate 25 qd and Xarelto 20 qd    #Duodenal diverticulum.   ·  Plan: On CT found to have: In the medial aspect of the second portion of the duodenum, there are few air pockets, which may represent air within the duodenal diverticulum or contained perforation  As per ED provider Surgery was consulted and recommended no surgical intervention     #Hiatal hernia  ·  Plan: CT showed: A portion of the pancreatic tail herniating into the intrathoracic  space via esophageal hiatus as well as intrathoracic stomach.   Home meds: Omeprazole 40 qd. c/w pantoprazole 40 (interchange)     #ATN (acute tubular necrosis).   ·  Plan: On admission: BUN 56, creatinine 0.99 with granular cast, likley i/s/o sepsis   -Monitor BMP s/p fluids.    #Pancreatic lesion.   ·  Plan: CT showed: Ill-defined hypoattenuation of the pancreatic head.   Plan:   Consider MRI abdomen as outpatient.    #High anion gap metabolic acidosis.   ·  Plan: Bicarb 20, AG 17 likely i/s/o lactic acidosis   Plan:   monitor BMP.    Problem: HTN (hypertension).   ·  Plan; Home meds: Losartan 50 qd  -hold as normotensive, restart if needed.    Problem: DM (diabetes mellitus).   ·  Plan: Home meds: Alogliptin 25 qd, Metformin 1000 BID, Insulin Glargine 5U qhs  Plan:   -iss  -Hold oral agents  -Resume Glargine 5 units qhs.      Patient was discharged to: [facility]    New medications: Ertapenem 1g q24h ( 11/30-12/6 )  Changes to old medications: none  Medications that were stopped: none    Items to follow up as outpatient: follow up with Dr. Alexis (infectious disease)    Physical exam at the time of discharge:  Constitutional: resting comfortably in bed; NAD  Head: NC/AT  Eyes: PERRL, EOMI, anicteric sclera  ENT: no nasal discharge; MMM  Neck: supple; no JVD or thyromegaly  Respiratory: CTA B/L; no W/R/R, no retractions  Cardiac: 3/6 systolic murmur most prominent in RSB but heard over precordium, normal RR  Gastrointestinal: abdomen soft, NT/ND; no rebound or guarding; +BSx4  Back: unstagable sacral ulcer without purulence, bleeding  Extremities: WWP, no clubbing or cyanosis; no peripheral edema  Musculoskeletal: NROM x4; no joint swelling, tenderness or erythema  Vascular: 2+ radial, DP/PT pulses B/L  Dermatologic: skin warm, dry and intact; no rashes, wounds, or scars  Lymphatic: no submandibular or cervical LAD  Neurologic: Alert and awake, tracks with eyes, squeezes hands, but minimally verbal (this is his baseline per family)

## 2024-12-02 NOTE — DISCHARGE NOTE PROVIDER - DETAILS OF MALNUTRITION DIAGNOSIS/DIAGNOSES
This patient has been assessed with a concern for Malnutrition and was treated during this hospitalization for the following Nutrition diagnosis/diagnoses:     -  11/29/2024: Severe protein-calorie malnutrition

## 2024-12-02 NOTE — PROGRESS NOTE ADULT - SUBJECTIVE AND OBJECTIVE BOX
Patient is a 79y old  Male who presents with a chief complaint of Severe sepsis (01 Dec 2024 13:37)      HOSPITAL COURSE:     OVERNIGHT EVENTS:    SUBJECTIVE:     ROS: otherwise negative      T(C): 36.5 (12-02-24 @ 05:31), Max: 36.5 (12-02-24 @ 05:31)  HR: 79 (12-02-24 @ 05:31) (79 - 86)  BP: 128/75 (12-02-24 @ 05:31) (126/79 - 134/78)  RR: 18 (12-02-24 @ 05:31) (18 - 18)  SpO2: 99% (12-02-24 @ 05:31) (97% - 99%)  Wt(kg): --Vital Signs Last 24 Hrs  T(C): 36.5 (02 Dec 2024 05:31), Max: 36.5 (02 Dec 2024 05:31)  T(F): 97.7 (02 Dec 2024 05:31), Max: 97.7 (02 Dec 2024 05:31)  HR: 79 (02 Dec 2024 05:31) (79 - 86)  BP: 128/75 (02 Dec 2024 05:31) (126/79 - 134/78)  BP(mean): 97 (01 Dec 2024 08:07) (97 - 97)  RR: 18 (02 Dec 2024 05:31) (18 - 18)  SpO2: 99% (02 Dec 2024 05:31) (97% - 99%)    Parameters below as of 01 Dec 2024 20:44  Patient On (Oxygen Delivery Method): room air        PHYSICAL EXAM:  Constitutional: resting comfortably in bed; NAD  Head: NC/AT  Eyes: PERRL, EOMI, anicteric sclera  ENT: no nasal discharge; MMM  Neck: supple; no JVD or thyromegaly  Respiratory: CTA B/L; no W/R/R, no retractions  Cardiac: +S1/S2; RRR; no M/R/G  Gastrointestinal: soft, NT/ND; no rebound or guarding; +BSx4  Back: spine midline, no bony tenderness or step-offs; no CVAT B/L  Extremities: WWP, no clubbing or cyanosis; no peripheral edema. Capillary refill <2 sec  Musculoskeletal: NROM x4; no joint swelling, tenderness or erythema  Vascular: 2+ radial, DP/PT pulses B/L  Dermatologic: skin warm, dry and intact; no rashes, wounds, or scars  Lymphatic: no submandibular or cervical LAD  Neurologic: AAOx3; CNII-XII grossly intact; no focal deficits  Psychiatric: affect and characteristics of appearance, verbalizations, behaviors are appropriate    LABS:                        8.5    7.46  )-----------( 288      ( 01 Dec 2024 05:30 )             27.0     12-01    140  |  109[H]  |  22  ----------------------------<  210[H]  4.0   |  24  |  0.63    Ca    8.5      01 Dec 2024 05:30  Phos  2.7     12-01  Mg     1.5     12-01      Iron 14, TIBC 148, %Sat 9, Ferritin 488/ 11-29-24 @ 07:24      Urinalysis Basic - ( 01 Dec 2024 05:30 )    Color: x / Appearance: x / SG: x / pH: x  Gluc: 210 mg/dL / Ketone: x  / Bili: x / Urobili: x   Blood: x / Protein: x / Nitrite: x   Leuk Esterase: x / RBC: x / WBC x   Sq Epi: x / Non Sq Epi: x / Bacteria: x      CAPILLARY BLOOD GLUCOSE      POCT Blood Glucose.: 237 mg/dL (01 Dec 2024 21:51)  POCT Blood Glucose.: 186 mg/dL (01 Dec 2024 17:55)  POCT Blood Glucose.: 232 mg/dL (01 Dec 2024 13:24)  POCT Blood Glucose.: 76 mg/dL (01 Dec 2024 12:55)  POCT Blood Glucose.: 230 mg/dL (01 Dec 2024 09:41)        Urinalysis Basic - ( 01 Dec 2024 05:30 )    Color: x / Appearance: x / SG: x / pH: x  Gluc: 210 mg/dL / Ketone: x  / Bili: x / Urobili: x   Blood: x / Protein: x / Nitrite: x   Leuk Esterase: x / RBC: x / WBC x   Sq Epi: x / Non Sq Epi: x / Bacteria: x        MEDICATIONS  (STANDING):  ascorbic acid 500 milliGRAM(s) Oral two times a day  cholecalciferol 1000 Unit(s) Oral daily  collagenase Ointment 1 Application(s) Topical daily  cyanocobalamin 1000 MICROGram(s) Oral daily  dextrose 5%. 1000 milliLiter(s) (50 mL/Hr) IV Continuous <Continuous>  dextrose 5%. 1000 milliLiter(s) (100 mL/Hr) IV Continuous <Continuous>  dextrose 50% Injectable 25 Gram(s) IV Push once  dextrose 50% Injectable 12.5 Gram(s) IV Push once  dextrose 50% Injectable 25 Gram(s) IV Push once  ertapenem  IVPB      ertapenem  IVPB 1000 milliGRAM(s) IV Intermittent every 24 hours  glucagon  Injectable 1 milliGRAM(s) IntraMuscular once  insulin glargine Injectable (LANTUS) 5 Unit(s) SubCutaneous at bedtime  insulin lispro (ADMELOG) corrective regimen sliding scale   SubCutaneous three times a day before meals  insulin lispro (ADMELOG) corrective regimen sliding scale   SubCutaneous at bedtime  magnesium sulfate  IVPB 2 Gram(s) IV Intermittent once  metoprolol succinate ER 25 milliGRAM(s) Oral every 24 hours  multivitamin 1 Tablet(s) Oral daily  pantoprazole    Tablet 40 milliGRAM(s) Oral before breakfast  rivaroxaban 20 milliGRAM(s) Oral with dinner  thiamine 100 milliGRAM(s) Oral daily  zinc sulfate 220 milliGRAM(s) Oral daily    MEDICATIONS  (PRN):  acetaminophen     Tablet .. 650 milliGRAM(s) Oral every 6 hours PRN Temp greater or equal to 38C (100.4F), Mild Pain (1 - 3)  aluminum hydroxide/magnesium hydroxide/simethicone Suspension 30 milliLiter(s) Oral every 4 hours PRN Dyspepsia  dextrose Oral Gel 15 Gram(s) Oral once PRN Blood Glucose LESS THAN 70 milliGRAM(s)/deciliter  melatonin 3 milliGRAM(s) Oral at bedtime PRN Insomnia  ondansetron Injectable 4 milliGRAM(s) IV Push every 8 hours PRN Nausea and/or Vomiting      RADIOLOGY & ADDITIONAL TESTS: Reviewed   Patient is a 79y old  Male who presents with a chief complaint of Severe sepsis (01 Dec 2024 13:37)    OVERNIGHT EVENTS: [place]    SUBJECTIVE: continues to be largely nonverbal, barely responding to verbal cues, but awake and alert and tracking clearly with eyes and face.     ROS: otherwise negative      T(C): 36.5 (12-02-24 @ 05:31), Max: 36.5 (12-02-24 @ 05:31)  HR: 79 (12-02-24 @ 05:31) (79 - 86)  BP: 128/75 (12-02-24 @ 05:31) (126/79 - 134/78)  RR: 18 (12-02-24 @ 05:31) (18 - 18)  SpO2: 99% (12-02-24 @ 05:31) (97% - 99%)  Wt(kg): --Vital Signs Last 24 Hrs  T(C): 36.5 (02 Dec 2024 05:31), Max: 36.5 (02 Dec 2024 05:31)  T(F): 97.7 (02 Dec 2024 05:31), Max: 97.7 (02 Dec 2024 05:31)  HR: 79 (02 Dec 2024 05:31) (79 - 86)  BP: 128/75 (02 Dec 2024 05:31) (126/79 - 134/78)  BP(mean): 97 (01 Dec 2024 08:07) (97 - 97)  RR: 18 (02 Dec 2024 05:31) (18 - 18)  SpO2: 99% (02 Dec 2024 05:31) (97% - 99%)    Parameters below as of 01 Dec 2024 20:44  Patient On (Oxygen Delivery Method): room air      PHYSICAL EXAM:  Constitutional: resting comfortably in bed; NAD  Head: NC/AT  Eyes: PERRL, EOMI, anicteric sclera  ENT: no nasal discharge; MMM  Neck: supple; no JVD or thyromegaly  Respiratory: CTA B/L; no W/R/R, no retractions  Cardiac: grade 3 systolic murmur most prominent over LSB  Gastrointestinal: NTND, bs present  Extremities: WWP, no clubbing or cyanosis; no peripheral edema. Capillary refill <2 sec  Musculoskeletal: NROM x4; no joint swelling, tenderness or erythema  Vascular: 2+ radial, DP/PT pulses B/L  Dermatologic: skin warm, dry and intact; no rashes, wounds, or scars  Lymphatic: no submandibular or cervical LAD  Neurologic: lethargic but arousable to loud verbal stimuli and can open eyes and squeeze hand      LABS:                        8.5    7.46  )-----------( 288      ( 01 Dec 2024 05:30 )             27.0     12-01    140  |  109[H]  |  22  ----------------------------<  210[H]  4.0   |  24  |  0.63    Ca    8.5      01 Dec 2024 05:30  Phos  2.7     12-01  Mg     1.5     12-01      Iron 14, TIBC 148, %Sat 9, Ferritin 488/ 11-29-24 @ 07:24      Urinalysis Basic - ( 01 Dec 2024 05:30 )    Color: x / Appearance: x / SG: x / pH: x  Gluc: 210 mg/dL / Ketone: x  / Bili: x / Urobili: x   Blood: x / Protein: x / Nitrite: x   Leuk Esterase: x / RBC: x / WBC x   Sq Epi: x / Non Sq Epi: x / Bacteria: x      CAPILLARY BLOOD GLUCOSE      POCT Blood Glucose.: 237 mg/dL (01 Dec 2024 21:51)  POCT Blood Glucose.: 186 mg/dL (01 Dec 2024 17:55)  POCT Blood Glucose.: 232 mg/dL (01 Dec 2024 13:24)  POCT Blood Glucose.: 76 mg/dL (01 Dec 2024 12:55)  POCT Blood Glucose.: 230 mg/dL (01 Dec 2024 09:41)        Urinalysis Basic - ( 01 Dec 2024 05:30 )    Color: x / Appearance: x / SG: x / pH: x  Gluc: 210 mg/dL / Ketone: x  / Bili: x / Urobili: x   Blood: x / Protein: x / Nitrite: x   Leuk Esterase: x / RBC: x / WBC x   Sq Epi: x / Non Sq Epi: x / Bacteria: x        MEDICATIONS  (STANDING):  ascorbic acid 500 milliGRAM(s) Oral two times a day  cholecalciferol 1000 Unit(s) Oral daily  collagenase Ointment 1 Application(s) Topical daily  cyanocobalamin 1000 MICROGram(s) Oral daily  dextrose 5%. 1000 milliLiter(s) (50 mL/Hr) IV Continuous <Continuous>  dextrose 5%. 1000 milliLiter(s) (100 mL/Hr) IV Continuous <Continuous>  dextrose 50% Injectable 25 Gram(s) IV Push once  dextrose 50% Injectable 12.5 Gram(s) IV Push once  dextrose 50% Injectable 25 Gram(s) IV Push once  ertapenem  IVPB      ertapenem  IVPB 1000 milliGRAM(s) IV Intermittent every 24 hours  glucagon  Injectable 1 milliGRAM(s) IntraMuscular once  insulin glargine Injectable (LANTUS) 5 Unit(s) SubCutaneous at bedtime  insulin lispro (ADMELOG) corrective regimen sliding scale   SubCutaneous three times a day before meals  insulin lispro (ADMELOG) corrective regimen sliding scale   SubCutaneous at bedtime  magnesium sulfate  IVPB 2 Gram(s) IV Intermittent once  metoprolol succinate ER 25 milliGRAM(s) Oral every 24 hours  multivitamin 1 Tablet(s) Oral daily  pantoprazole    Tablet 40 milliGRAM(s) Oral before breakfast  rivaroxaban 20 milliGRAM(s) Oral with dinner  thiamine 100 milliGRAM(s) Oral daily  zinc sulfate 220 milliGRAM(s) Oral daily    MEDICATIONS  (PRN):  acetaminophen     Tablet .. 650 milliGRAM(s) Oral every 6 hours PRN Temp greater or equal to 38C (100.4F), Mild Pain (1 - 3)  aluminum hydroxide/magnesium hydroxide/simethicone Suspension 30 milliLiter(s) Oral every 4 hours PRN Dyspepsia  dextrose Oral Gel 15 Gram(s) Oral once PRN Blood Glucose LESS THAN 70 milliGRAM(s)/deciliter  melatonin 3 milliGRAM(s) Oral at bedtime PRN Insomnia  ondansetron Injectable 4 milliGRAM(s) IV Push every 8 hours PRN Nausea and/or Vomiting      RADIOLOGY & ADDITIONAL TESTS: Reviewed

## 2024-12-02 NOTE — PROGRESS NOTE ADULT - PROBLEM SELECTOR PLAN 12
Home med: Mirtazapine 15 qhs    Plan:   -Hold mirtazapine given concern for sedation nutrition consulted

## 2024-12-02 NOTE — DISCHARGE NOTE PROVIDER - NSDCMRMEDTOKEN_GEN_ALL_CORE_FT
alogliptin 25 mg oral tablet: 1 tab(s) orally once a day  cholecalciferol 50 mcg (2000 intl units) oral capsule: 1 cap(s) orally once a day  ferrous sulfate: 1 cap(s) orally once a day  Glucophage 1000 mg oral tablet: 1 tab(s) orally 2 times a day  Insulin Glargine: 5 unit(s) subcutaneous once a day  loperamide 2 mg oral tablet: 1 tab(s) orally 2 times a day as needed for  diarrhea  losartan 50 mg oral tablet: 1 tab(s) orally once a day  metoprolol succinate 25 mg oral tablet, extended release: 1 tab(s) orally once a day  mirtazapine 15 mg oral tablet: 1 tab(s) orally once a day  omeprazole 40 mg oral delayed release capsule: 1 cap(s) orally once a day  Xarelto 20 mg oral tablet: 1 tab(s) orally once a day   alogliptin 25 mg oral tablet: 1 tab(s) orally once a day  cholecalciferol 50 mcg (2000 intl units) oral capsule: 1 cap(s) orally once a day  collagenase 250 units/g topical ointment: 1 Apply topically to affected area once a day  ferrous sulfate: 1 cap(s) orally once a day  Glucophage 1000 mg oral tablet: 1 tab(s) orally 2 times a day  Insulin Glargine: 5 unit(s) subcutaneous once a day  loperamide 2 mg oral tablet: 1 tab(s) orally 2 times a day as needed for  diarrhea  losartan 50 mg oral tablet: 1 tab(s) orally once a day  metoprolol succinate 25 mg oral tablet, extended release: 1 tab(s) orally once a day  mirtazapine 15 mg oral tablet: 1 tab(s) orally once a day  omeprazole 40 mg oral delayed release capsule: 1 cap(s) orally once a day  Xarelto 20 mg oral tablet: 1 tab(s) orally once a day   alogliptin 25 mg oral tablet: 1 tab(s) orally once a day  cholecalciferol 50 mcg (2000 intl units) oral capsule: 1 cap(s) orally once a day  collagenase 250 units/g topical ointment: 1 Apply topically to affected area once a day  ertapenem 1 g injection: 1,000 milligram(s) injectable once a day  ferrous sulfate: 1 cap(s) orally once a day  Glucophage 1000 mg oral tablet: 1 tab(s) orally 2 times a day  Insulin Glargine: 5 unit(s) subcutaneous once a day  loperamide 2 mg oral tablet: 1 tab(s) orally 2 times a day as needed for  diarrhea  losartan 50 mg oral tablet: 1 tab(s) orally once a day  metoprolol succinate 25 mg oral tablet, extended release: 1 tab(s) orally once a day  mirtazapine 15 mg oral tablet: 1 tab(s) orally once a day  omeprazole 40 mg oral delayed release capsule: 1 cap(s) orally once a day  Xarelto 20 mg oral tablet: 1 tab(s) orally once a day

## 2024-12-03 LAB
ANION GAP SERPL CALC-SCNC: 11 MMOL/L — SIGNIFICANT CHANGE UP (ref 5–17)
BUN SERPL-MCNC: 20 MG/DL — SIGNIFICANT CHANGE UP (ref 7–23)
CALCIUM SERPL-MCNC: 8.5 MG/DL — SIGNIFICANT CHANGE UP (ref 8.4–10.5)
CHLORIDE SERPL-SCNC: 109 MMOL/L — HIGH (ref 96–108)
CO2 SERPL-SCNC: 25 MMOL/L — SIGNIFICANT CHANGE UP (ref 22–31)
CREAT SERPL-MCNC: 0.63 MG/DL — SIGNIFICANT CHANGE UP (ref 0.5–1.3)
EGFR: 97 ML/MIN/1.73M2 — SIGNIFICANT CHANGE UP
GLUCOSE SERPL-MCNC: 115 MG/DL — HIGH (ref 70–99)
HCT VFR BLD CALC: 28.3 % — LOW (ref 39–50)
HGB BLD-MCNC: 8.6 G/DL — LOW (ref 13–17)
MAGNESIUM SERPL-MCNC: 2 MG/DL — SIGNIFICANT CHANGE UP (ref 1.6–2.6)
MCHC RBC-ENTMCNC: 27.3 PG — SIGNIFICANT CHANGE UP (ref 27–34)
MCHC RBC-ENTMCNC: 30.4 G/DL — LOW (ref 32–36)
MCV RBC AUTO: 89.8 FL — SIGNIFICANT CHANGE UP (ref 80–100)
NRBC # BLD: 0 /100 WBCS — SIGNIFICANT CHANGE UP (ref 0–0)
PHOSPHATE SERPL-MCNC: 3.2 MG/DL — SIGNIFICANT CHANGE UP (ref 2.5–4.5)
PLATELET # BLD AUTO: 360 K/UL — SIGNIFICANT CHANGE UP (ref 150–400)
POTASSIUM SERPL-MCNC: 3.2 MMOL/L — LOW (ref 3.5–5.3)
POTASSIUM SERPL-SCNC: 3.2 MMOL/L — LOW (ref 3.5–5.3)
RBC # BLD: 3.15 M/UL — LOW (ref 4.2–5.8)
RBC # FLD: 14.5 % — SIGNIFICANT CHANGE UP (ref 10.3–14.5)
SODIUM SERPL-SCNC: 145 MMOL/L — SIGNIFICANT CHANGE UP (ref 135–145)
WBC # BLD: 4.6 K/UL — SIGNIFICANT CHANGE UP (ref 3.8–10.5)
WBC # FLD AUTO: 4.6 K/UL — SIGNIFICANT CHANGE UP (ref 3.8–10.5)

## 2024-12-03 PROCEDURE — 99232 SBSQ HOSP IP/OBS MODERATE 35: CPT | Mod: GC

## 2024-12-03 PROCEDURE — 99233 SBSQ HOSP IP/OBS HIGH 50: CPT | Mod: GC

## 2024-12-03 RX ORDER — ERTAPENEM 1 G/1
1000 INJECTION, POWDER, LYOPHILIZED, FOR SOLUTION INTRAMUSCULAR; INTRAVENOUS
Qty: 0 | Refills: 0 | DISCHARGE
Start: 2024-12-03

## 2024-12-03 RX ORDER — ERTAPENEM 1 G/1
1000 INJECTION, POWDER, LYOPHILIZED, FOR SOLUTION INTRAMUSCULAR; INTRAVENOUS EVERY 24 HOURS
Refills: 0 | Status: DISCONTINUED | OUTPATIENT
Start: 2024-12-03 | End: 2024-12-04

## 2024-12-03 RX ORDER — POTASSIUM CHLORIDE 600 MG/1
40 TABLET, EXTENDED RELEASE ORAL ONCE
Refills: 0 | Status: DISCONTINUED | OUTPATIENT
Start: 2024-12-03 | End: 2024-12-03

## 2024-12-03 RX ORDER — POTASSIUM CHLORIDE 600 MG/1
40 TABLET, EXTENDED RELEASE ORAL ONCE
Refills: 0 | Status: COMPLETED | OUTPATIENT
Start: 2024-12-03 | End: 2024-12-03

## 2024-12-03 RX ADMIN — POTASSIUM CHLORIDE 40 MILLIEQUIVALENT(S): 600 TABLET, EXTENDED RELEASE ORAL at 18:30

## 2024-12-03 RX ADMIN — Medication 1000 UNIT(S): at 13:16

## 2024-12-03 RX ADMIN — METOPROLOL TARTRATE 25 MILLIGRAM(S): 100 TABLET, FILM COATED ORAL at 18:13

## 2024-12-03 RX ADMIN — INSULIN GLARGINE 8 UNIT(S): 100 INJECTION, SOLUTION SUBCUTANEOUS at 23:48

## 2024-12-03 RX ADMIN — Medication 500 MILLIGRAM(S): at 06:11

## 2024-12-03 RX ADMIN — PANTOPRAZOLE SODIUM 40 MILLIGRAM(S): 40 TABLET, DELAYED RELEASE ORAL at 06:11

## 2024-12-03 RX ADMIN — Medication 500 MILLIGRAM(S): at 18:13

## 2024-12-03 RX ADMIN — Medication 2: at 23:56

## 2024-12-03 RX ADMIN — Medication 100 MILLIGRAM(S): at 13:16

## 2024-12-03 RX ADMIN — Medication 220 MILLIGRAM(S): at 13:15

## 2024-12-03 RX ADMIN — Medication 1000 MICROGRAM(S): at 13:16

## 2024-12-03 RX ADMIN — RIVAROXABAN 20 MILLIGRAM(S): 10 TABLET, FILM COATED ORAL at 18:13

## 2024-12-03 RX ADMIN — Medication 1 TABLET(S): at 13:16

## 2024-12-03 RX ADMIN — ERTAPENEM 120 MILLIGRAM(S): 1 INJECTION, POWDER, LYOPHILIZED, FOR SOLUTION INTRAMUSCULAR; INTRAVENOUS at 18:13

## 2024-12-03 RX ADMIN — COLLAGENASE SANTYL 1 APPLICATION(S): 250 OINTMENT TOPICAL at 13:16

## 2024-12-03 RX ADMIN — Medication 2: at 14:13

## 2024-12-03 RX ADMIN — Medication 2: at 18:14

## 2024-12-03 NOTE — PROGRESS NOTE ADULT - NUTRITIONAL ASSESSMENT
This patient has been assessed with a concern for Malnutrition and has been determined to have a diagnosis/diagnoses of Severe protein-calorie malnutrition.    This patient is being managed with:   Diet Pureed-  Mildly Thick Liquids (MILDTHICKLIQS)  Supplement Feeding Modality:  Oral  Ensure Plus High Protein Cans or Servings Per Day:  1       Frequency:  Daily  Entered: Nov 29 2024  2:37PM  
This patient has been assessed with a concern for Malnutrition and has been determined to have a diagnosis/diagnoses of Severe protein-calorie malnutrition.    This patient is being managed with:   Diet Pureed-  Mildly Thick Liquids (MILDTHICKLIQS)  Supplement Feeding Modality:  Oral  Ensure Plus High Protein Cans or Servings Per Day:  1       Frequency:  Daily  Entered: Nov 29 2024  2:37PM  
This patient has been assessed with a concern for Malnutrition and has been determined to have a diagnosis/diagnoses of Severe protein-calorie malnutrition.    This patient is being managed with:   Diet Pureed-  Consistent Carbohydrate {Evening Snack} (CSTCHOSN)  Mildly Thick Liquids (MILDTHICKLIQS)  Supplement Feeding Modality:  Oral  Ensure Plus High Protein Cans or Servings Per Day:  1       Frequency:  Daily  Entered: Nov 30 2024  3:00PM  

## 2024-12-03 NOTE — PROGRESS NOTE ADULT - REASON FOR ADMISSION
Severe sepsis

## 2024-12-03 NOTE — PROGRESS NOTE ADULT - PROBLEM SELECTOR PLAN 10
Home meds: Losartan 50 qd    Plan:   -hold as normotensive, restart if needed

## 2024-12-03 NOTE — PROGRESS NOTE ADULT - PROBLEM SELECTOR PROBLEM 7
ATN (acute tubular necrosis)

## 2024-12-03 NOTE — PROGRESS NOTE ADULT - SUBJECTIVE AND OBJECTIVE BOX
INTERVAL HPI/OVERNIGHT EVENTS:    CONSTITUTIONAL:  Negative fever or chills, feels well, good appetite  EYES:  Negative  blurry vision or double vision  CARDIOVASCULAR:  Negative for chest pain or palpitations  RESPIRATORY:  Negative for cough, wheezing, or SOB   GASTROINTESTINAL:  Negative for nausea, vomiting, diarrhea, constipation, or abdominal pain  GENITOURINARY:  Negative frequency, urgency or dysuria  NEUROLOGIC:  No headache, confusion, dizziness, lightheadedness      ANTIBIOTICS/RELEVANT:    MEDICATIONS  (STANDING):  ascorbic acid 500 milliGRAM(s) Oral two times a day  cholecalciferol 1000 Unit(s) Oral daily  collagenase Ointment 1 Application(s) Topical daily  cyanocobalamin 1000 MICROGram(s) Oral daily  dextrose 5%. 1000 milliLiter(s) (100 mL/Hr) IV Continuous <Continuous>  dextrose 5%. 1000 milliLiter(s) (50 mL/Hr) IV Continuous <Continuous>  dextrose 50% Injectable 25 Gram(s) IV Push once  dextrose 50% Injectable 12.5 Gram(s) IV Push once  dextrose 50% Injectable 25 Gram(s) IV Push once  ertapenem  IVPB      ertapenem  IVPB 1000 milliGRAM(s) IV Intermittent every 24 hours  glucagon  Injectable 1 milliGRAM(s) IntraMuscular once  insulin glargine Injectable (LANTUS) 8 Unit(s) SubCutaneous at bedtime  insulin lispro (ADMELOG) corrective regimen sliding scale   SubCutaneous Before meals and at bedtime  metoprolol succinate ER 25 milliGRAM(s) Oral every 24 hours  multivitamin 1 Tablet(s) Oral daily  pantoprazole    Tablet 40 milliGRAM(s) Oral before breakfast  rivaroxaban 20 milliGRAM(s) Oral with dinner  thiamine 100 milliGRAM(s) Oral daily  zinc sulfate 220 milliGRAM(s) Oral daily    MEDICATIONS  (PRN):  acetaminophen     Tablet .. 650 milliGRAM(s) Oral every 6 hours PRN Temp greater or equal to 38C (100.4F), Mild Pain (1 - 3)  aluminum hydroxide/magnesium hydroxide/simethicone Suspension 30 milliLiter(s) Oral every 4 hours PRN Dyspepsia  dextrose Oral Gel 15 Gram(s) Oral once PRN Blood Glucose LESS THAN 70 milliGRAM(s)/deciliter  melatonin 3 milliGRAM(s) Oral at bedtime PRN Insomnia  ondansetron Injectable 4 milliGRAM(s) IV Push every 8 hours PRN Nausea and/or Vomiting        Vital Signs Last 24 Hrs  T(C): 36.6 (03 Dec 2024 06:08), Max: 37.6 (02 Dec 2024 21:05)  T(F): 97.8 (03 Dec 2024 06:08), Max: 99.7 (02 Dec 2024 21:05)  HR: 75 (03 Dec 2024 06:08) (75 - 92)  BP: 109/67 (03 Dec 2024 06:08) (109/67 - 119/76)  BP(mean): --  RR: 18 (03 Dec 2024 06:08) (17 - 18)  SpO2: 98% (03 Dec 2024 06:08) (98% - 98%)    Parameters below as of 02 Dec 2024 21:05  Patient On (Oxygen Delivery Method): room air        PHYSICAL EXAM:  Constitutional: NAD resting in bed  HEENT: EOMI	  Pulm: No respiratory distress, nonlabored breathing, on room air  Cardiovascular: HDS  Gastrointestinal: soft, TN/ND  Extremities: No edema  Derm: No obvious rashes  CNS: AOx3, No obvious focal deficits       LABS:                        8.8    5.16  )-----------( 281      ( 02 Dec 2024 05:30 )             28.2     12-02    145  |  110[H]  |  19  ----------------------------<  181[H]  4.2   |  27  |  0.56    Ca    8.4      02 Dec 2024 05:30  Phos  2.5     12-02  Mg     1.6     12-02        Urinalysis Basic - ( 02 Dec 2024 05:30 )    Color: x / Appearance: x / SG: x / pH: x  Gluc: 181 mg/dL / Ketone: x  / Bili: x / Urobili: x   Blood: x / Protein: x / Nitrite: x   Leuk Esterase: x / RBC: x / WBC x   Sq Epi: x / Non Sq Epi: x / Bacteria: x        MICROBIOLOGY:    RADIOLOGY & ADDITIONAL STUDIES: INTERVAL HPI/OVERNIGHT EVENTS: Afebrile, HDS     Pt resting in bed, sleeping awakes to verbal/ physical stimulus. however dose no participate with exam.         ANTIBIOTICS/RELEVANT:    MEDICATIONS  (STANDING):  ascorbic acid 500 milliGRAM(s) Oral two times a day  cholecalciferol 1000 Unit(s) Oral daily  collagenase Ointment 1 Application(s) Topical daily  cyanocobalamin 1000 MICROGram(s) Oral daily  dextrose 5%. 1000 milliLiter(s) (100 mL/Hr) IV Continuous <Continuous>  dextrose 5%. 1000 milliLiter(s) (50 mL/Hr) IV Continuous <Continuous>  dextrose 50% Injectable 25 Gram(s) IV Push once  dextrose 50% Injectable 12.5 Gram(s) IV Push once  dextrose 50% Injectable 25 Gram(s) IV Push once  ertapenem  IVPB      ertapenem  IVPB 1000 milliGRAM(s) IV Intermittent every 24 hours  glucagon  Injectable 1 milliGRAM(s) IntraMuscular once  insulin glargine Injectable (LANTUS) 8 Unit(s) SubCutaneous at bedtime  insulin lispro (ADMELOG) corrective regimen sliding scale   SubCutaneous Before meals and at bedtime  metoprolol succinate ER 25 milliGRAM(s) Oral every 24 hours  multivitamin 1 Tablet(s) Oral daily  pantoprazole    Tablet 40 milliGRAM(s) Oral before breakfast  rivaroxaban 20 milliGRAM(s) Oral with dinner  thiamine 100 milliGRAM(s) Oral daily  zinc sulfate 220 milliGRAM(s) Oral daily    MEDICATIONS  (PRN):  acetaminophen     Tablet .. 650 milliGRAM(s) Oral every 6 hours PRN Temp greater or equal to 38C (100.4F), Mild Pain (1 - 3)  aluminum hydroxide/magnesium hydroxide/simethicone Suspension 30 milliLiter(s) Oral every 4 hours PRN Dyspepsia  dextrose Oral Gel 15 Gram(s) Oral once PRN Blood Glucose LESS THAN 70 milliGRAM(s)/deciliter  melatonin 3 milliGRAM(s) Oral at bedtime PRN Insomnia  ondansetron Injectable 4 milliGRAM(s) IV Push every 8 hours PRN Nausea and/or Vomiting        Vital Signs Last 24 Hrs  T(C): 36.6 (03 Dec 2024 06:08), Max: 37.6 (02 Dec 2024 21:05)  T(F): 97.8 (03 Dec 2024 06:08), Max: 99.7 (02 Dec 2024 21:05)  HR: 75 (03 Dec 2024 06:08) (75 - 92)  BP: 109/67 (03 Dec 2024 06:08) (109/67 - 119/76)  BP(mean): --  RR: 18 (03 Dec 2024 06:08) (17 - 18)  SpO2: 98% (03 Dec 2024 06:08) (98% - 98%)    Parameters below as of 02 Dec 2024 21:05  Patient On (Oxygen Delivery Method): room air        PHYSICAL EXAM:  Constitutional: NAD resting in bed  HEENT: EOMI	  Pulm: No respiratory distress, nonlabored breathing, on room air  Cardiovascular: HDS  Gastrointestinal: soft, TN/ND  Extremities: WWP, no edema   CNS: awake and alert, but difficult to assess orientation given dementia and deafness    LABS:                        8.8    5.16  )-----------( 281      ( 02 Dec 2024 05:30 )             28.2     12-02    145  |  110[H]  |  19  ----------------------------<  181[H]  4.2   |  27  |  0.56    Ca    8.4      02 Dec 2024 05:30  Phos  2.5     12-02  Mg     1.6     12-02        Urinalysis Basic - ( 02 Dec 2024 05:30 )    Color: x / Appearance: x / SG: x / pH: x  Gluc: 181 mg/dL / Ketone: x  / Bili: x / Urobili: x   Blood: x / Protein: x / Nitrite: x   Leuk Esterase: x / RBC: x / WBC x   Sq Epi: x / Non Sq Epi: x / Bacteria: x        MICROBIOLOGY:    RADIOLOGY & ADDITIONAL STUDIES: INTERVAL HPI/OVERNIGHT EVENTS: Afebrile, HDS     Pt resting in bed, sleeping awakes to verbal/ physical stimulus. however dose no participate with exam.         ANTIBIOTICS/RELEVANT:    MEDICATIONS  (STANDING):  ascorbic acid 500 milliGRAM(s) Oral two times a day  cholecalciferol 1000 Unit(s) Oral daily  collagenase Ointment 1 Application(s) Topical daily  cyanocobalamin 1000 MICROGram(s) Oral daily  dextrose 5%. 1000 milliLiter(s) (100 mL/Hr) IV Continuous <Continuous>  dextrose 5%. 1000 milliLiter(s) (50 mL/Hr) IV Continuous <Continuous>  dextrose 50% Injectable 25 Gram(s) IV Push once  dextrose 50% Injectable 12.5 Gram(s) IV Push once  dextrose 50% Injectable 25 Gram(s) IV Push once  ertapenem  IVPB      ertapenem  IVPB 1000 milliGRAM(s) IV Intermittent every 24 hours  glucagon  Injectable 1 milliGRAM(s) IntraMuscular once  insulin glargine Injectable (LANTUS) 8 Unit(s) SubCutaneous at bedtime  insulin lispro (ADMELOG) corrective regimen sliding scale   SubCutaneous Before meals and at bedtime  metoprolol succinate ER 25 milliGRAM(s) Oral every 24 hours  multivitamin 1 Tablet(s) Oral daily  pantoprazole    Tablet 40 milliGRAM(s) Oral before breakfast  rivaroxaban 20 milliGRAM(s) Oral with dinner  thiamine 100 milliGRAM(s) Oral daily  zinc sulfate 220 milliGRAM(s) Oral daily    MEDICATIONS  (PRN):  acetaminophen     Tablet .. 650 milliGRAM(s) Oral every 6 hours PRN Temp greater or equal to 38C (100.4F), Mild Pain (1 - 3)  aluminum hydroxide/magnesium hydroxide/simethicone Suspension 30 milliLiter(s) Oral every 4 hours PRN Dyspepsia  dextrose Oral Gel 15 Gram(s) Oral once PRN Blood Glucose LESS THAN 70 milliGRAM(s)/deciliter  melatonin 3 milliGRAM(s) Oral at bedtime PRN Insomnia  ondansetron Injectable 4 milliGRAM(s) IV Push every 8 hours PRN Nausea and/or Vomiting        Vital Signs Last 24 Hrs  T(C): 36.6 (03 Dec 2024 06:08), Max: 37.6 (02 Dec 2024 21:05)  T(F): 97.8 (03 Dec 2024 06:08), Max: 99.7 (02 Dec 2024 21:05)  HR: 75 (03 Dec 2024 06:08) (75 - 92)  BP: 109/67 (03 Dec 2024 06:08) (109/67 - 119/76)  BP(mean): --  RR: 18 (03 Dec 2024 06:08) (17 - 18)  SpO2: 98% (03 Dec 2024 06:08) (98% - 98%)    Parameters below as of 02 Dec 2024 21:05  Patient On (Oxygen Delivery Method): room air        PHYSICAL EXAM:  Constitutional: NAD resting in bed  HEENT: EOMI	  Pulm: No respiratory distress, nonlabored breathing, on room air  Cardiovascular: HDS  Gastrointestinal: soft, TN/ND  Extremities: WWP, no edema   CNS: awake but difficult to assess orientation given dementia and deafness    LABS:                        8.8    5.16  )-----------( 281      ( 02 Dec 2024 05:30 )             28.2     12-02    145  |  110[H]  |  19  ----------------------------<  181[H]  4.2   |  27  |  0.56    Ca    8.4      02 Dec 2024 05:30  Phos  2.5     12-02  Mg     1.6     12-02        Urinalysis Basic - ( 02 Dec 2024 05:30 )    Color: x / Appearance: x / SG: x / pH: x  Gluc: 181 mg/dL / Ketone: x  / Bili: x / Urobili: x   Blood: x / Protein: x / Nitrite: x   Leuk Esterase: x / RBC: x / WBC x   Sq Epi: x / Non Sq Epi: x / Bacteria: x        MICROBIOLOGY:    Culture - Blood (collected 01 Dec 2024 16:13)  Source: .Blood BLOOD  Preliminary Report (03 Dec 2024 02:02):    No growth at 24 hours    Culture - Blood (collected 30 Nov 2024 11:07)  Source: .Blood BLOOD  Preliminary Report (03 Dec 2024 15:01):    No growth at 72 Hours    Culture - Blood (collected 29 Nov 2024 11:46)  Source: .Blood BLOOD  Gram Stain (30 Nov 2024 22:29):    Growth in anaerobic bottle: Gram Negative Rods  Final Report (01 Dec 2024 14:45):    Growth in anaerobic bottle: Proteus mirabilis ESBL    See previous culture [id number]    Culture - Blood (collected 28 Nov 2024 13:14)  Source: .Blood BLOOD  Gram Stain (29 Nov 2024 11:52):    Growth in aerobic bottle: Gram Negative Rods    Growth in anaerobic bottle: Gram Negative Rods  Final Report (01 Dec 2024 10:00):    Growth in aerobic and anaerobic bottles: Proteus mirabilis ESBL    Direct identification is available within approximately 3-5    hours either by Blood Panel Multiplexed PCR or Direct    MALDI-TOF. Details: [email or web address]  Organism: Blood Culture PCR  Proteus mirabilis ESBL (01 Dec 2024 10:00)  Organism: Proteus mirabilis ESBL (01 Dec 2024 10:00)  Organism: Blood Culture PCR (01 Dec 2024 10:00)    Culture - Blood (collected 28 Nov 2024 13:14)  Source: .Blood BLOOD  Gram Stain (29 Nov 2024 11:05):    Growth in aerobic bottle: Gram Negative Rods    Growth in anaerobic bottle: Gram Negative Rods  Final Report (01 Dec 2024 10:01):    Growth in aerobic and anaerobic bottles: Proteus mirabilis ESBL    See previous culture [id number]    Urinalysis with Rflx Culture (collected 28 Nov 2024 11:50)        RADIOLOGY & ADDITIONAL STUDIES:

## 2024-12-03 NOTE — PROGRESS NOTE ADULT - PROBLEM SELECTOR PLAN 9
Bicarb 20, AG 17 likely i/s/o lactic acidosis     Plan:   monitor BMP

## 2024-12-03 NOTE — PROGRESS NOTE ADULT - PROBLEM SELECTOR PLAN 3
On presentation HR 160s. EKG: , QTc 398, regular narrow complex tachycardia, likely SVT. Troponin downtrended Troponin 103->91. On admission VS stable s/p fluids HR 99, /76   Home meds: Metoprolol succinate 25 qd and Xarelto 20 qd    Plan:   -c/w Metoprolol succinate 25 qd and Xarelto 20 qd
On presentation HR 160s. EKG: , QTc 398, regular narrow complex tachycardia, likely SVT. Troponin downtrended Troponin 103->91. On admission VS stable s/p fluids HR 99, /76   Loud systolic murmur on R Sternal Border   Home meds: Metoprolol succinate 25 qd and Xarelto 20 qd    Plan:   -Repeat EKG  -c/w Metoprolol succinate 25 qd and Xarelto 20 qd  -obtain TTE

## 2024-12-03 NOTE — PROGRESS NOTE ADULT - PROBLEM SELECTOR PLAN 4
On CT found to have: In the medial aspect of the second portion of the duodenum, there are few air pockets, which may represent air within the duodenal diverticulum or contained perforation  Abdomen with mild diffuse tenderness on exam  As per ED provider Surgery was consulted and recommended no surgical intervention     Plan:   -f/u final surgery recs
On CT found to have: In the medial aspect of the second portion of the duodenum, there are few air pockets, which may represent air within the duodenal diverticulum or contained perforation  As per ED provider Surgery was consulted and recommended no surgical intervention     Plan:   -No surgical intervention, likely not abnormal

## 2024-12-03 NOTE — PROGRESS NOTE ADULT - PROBLEM SELECTOR PLAN 1
Patient presented with severe sepsis: Fever 100.4, , and elevated Lactate as well as lethargy. Source likely  PNA given L lower lobe opacity  vs Sacral ulcer   CT showed: Left lower lobe pneumonia. Skin thickening and subcutaneous fat stranding/small collection in the sacrum, consistent with sacral decubitus ulcer. No CT evidence of osteomyelitis.  Currently satting well in RA. Sacral Ulcer noted with foul smell purulent discharge     Plan:   - BCx with 2/2 GNR, proteus, ESBL, but most recent cultures NGTD from 11/30, need to follow up yesterday's cultures  - Currently on Ertapenem  - ID consulted  - Wound care for unstageable sacral wound  - surgery recs regarding possible drain of sacral ulcer collection > no acute surgical intervention per surgery, but needs general wound care Patient presented with severe sepsis: Fever 100.4, , and elevated Lactate as well as lethargy. Source likely  PNA given L lower lobe opacity  vs Sacral ulcer   CT showed: Left lower lobe pneumonia. Skin thickening and subcutaneous fat stranding/small collection in the sacrum, consistent with sacral decubitus ulcer. No CT evidence of osteomyelitis.  Currently satting well in RA. Sacral Ulcer noted with foul smell purulent discharge     Plan:   - BCx with 2/2 GNR, proteus, ESBL, but most recent cultures all NGTD   - Currently on Ertapenem  - f/u final ID recs   - Wound care for unstageable sacral wound  - surgery recs regarding possible drain of sacral ulcer collection > no acute surgical intervention per surgery, but needs general wound care

## 2024-12-03 NOTE — PROGRESS NOTE ADULT - PROBLEM SELECTOR PLAN 7
On admission: BUN 56, creatinine 0.99 with granular cast, theresa i/s/o sepsis     Plan:   -Monitor BMP s/p fluids
On admission: BUN 56, creatinine 0.99 with granular cast, theresa i/s/o sepsis     Plan:   -Monitor BMP s/p fluids  -Strict I/O
On admission: BUN 56, creatinine 0.99 with granular cast, theresa i/s/o sepsis     Plan:   -Monitor BMP s/p fluids

## 2024-12-03 NOTE — PROGRESS NOTE ADULT - PROBLEM SELECTOR PLAN 11
Home meds: Alogliptin 25 qd, Metformin 1000 BID, Insulin Glargine 5U qhs    Plan:   -iss  -Hold oral agents  -Resume Glargine 5 units qhs
Home meds: Alogliptin 25 qd, Metformin 1000 BID, Insulin Glargine 5U qhs    Plan:   -iss  -Hold oral agents  -Resume Glargine 5 units qhs
Home meds: Alogliptin 25 qd, Metformin 1000 BID, Insulin Glargine 5U qhs    Plan:   -check a1c  -iss
Home meds: Alogliptin 25 qd, Metformin 1000 BID, Insulin Glargine 5U qhs    Plan:   -iss  -Hold oral agents  -Resume Glargine 5 units qhs
Home meds: Alogliptin 25 qd, Metformin 1000 BID, Insulin Glargine 5U qhs    Plan:   -iss  -Hold oral agents  -Resume Glargine 5 units qhs

## 2024-12-03 NOTE — PROGRESS NOTE ADULT - PROBLEM SELECTOR PROBLEM 4
Duodenal diverticulum

## 2024-12-03 NOTE — PROGRESS NOTE ADULT - PROVIDER SPECIALTY LIST ADULT
Infectious Disease
Infectious Disease
Surgery
Infectious Disease
Surgery
Infectious Disease
Internal Medicine

## 2024-12-03 NOTE — PROGRESS NOTE ADULT - PROBLEM SELECTOR PROBLEM 9
High anion gap metabolic acidosis

## 2024-12-03 NOTE — PROGRESS NOTE ADULT - TIME BILLING
ESBL P mirabilis bacteremia, PNA
ESBL P mirabilis bacteremia, PNA
coordinating care, discussing with consultants, counseling patient
Review of hospital course, labs, vitals and medical records  Bedside exam and patient interview   Discussion of plan with housestaff and consultants   Documenting the encounter
Review of hospital course, labs, vitals and medical records  Bedside exam and patient interview   Discussion of plan with housestaff and consultants   Documenting the encounter
Bedside exam and interview   Reviewed vitals, labs   Discussed patient's plan of care with housestaff   Documentation of encounter  Excludes teaching and separately reported services

## 2024-12-03 NOTE — PROGRESS NOTE ADULT - PROBLEM SELECTOR PROBLEM 11
Referral completed, faxed, and patient notified.   
DM (diabetes mellitus)

## 2024-12-03 NOTE — PROGRESS NOTE ADULT - ATTENDING COMMENTS
Coverage for Dr. Alexis. 79M w pmhx AFib, esophageal cancer (s/p partial esophagectomy), ulcerative colitis, DM, peripheral neuropathy, HTN, HLD, asthma, depression/anxiety admitted w somnolence/lethargy, found to have imaging concerning for LLL lung consolidation, felt to have metabolic encephalopathy 2/2 PNA vs infected sacral ulcer.  CT A/P showed air in duodenum - possible contained perf but exam is benign. 11/28 BCx grew ESBL-producing Proteus mirabilis S ertapenem.  Surveillance culture from 11/29 also is growing ESBL-producing proteus, blood culture 11/30 NGTD. Last documented fever on 11/29. Would send additional surveillance blood culture today, continue ertapenem.  I will cover through 12/1, Dr Alexis will resume care 12/2 - team 1.
Coverage for Dr. Alexis. 79M w pmhx AFib, esophageal cancer (s/p partial esophagectomy), ulcerative colitis, DM, peripheral neuropathy, HTN, HLD, asthma, depression/anxiety admitted w somnolence/lethargy, found to have imaging concerning for LLL lung consolidation, felt to have metabolic encephalopathy 2/2 PNA vs infected sacral ulcer. 11/28 BCx + GNRs. PCR id'ed as ESBL Proteus spp. Would f/u susceptibilities of Proteus from 11/28, f/u surveillance blood culture from 11/29, continue ertapenem.  I will cover through 12/1, Dr Alexis will resume care 12/2 - team 1.
79M w pmhx AFib, esophageal cancer (s/p partial esophagectomy), ulcerative colitis, DM, peripheral neuropathy, HTN, HLD, asthma, depression/anxiety admitted w somnolence/lethargy, found to have imaging concerning for LLL lung consolidation, felt to have metabolic encephalopathy 2/2 PNA vs infected sacral ulcer.  CT A/P showed air in duodenum - ?contained perf, however exam benign and surgery felt this could be air in diverticulum. Sacral ulcer not felt to be infected.   11/28 BCx grew ESBL-producing Proteus mirabilis (S- Ertapenem).    11/29 Surveillance BCx ESBL P mirabilis,   11/30 BCx NGTD, 72 hr  12/1 BCx NGTD, 24 hr  Last documented fever on 11/29, afebrile, HDS x 48 hr.  - F/u 11/30 and 12/1 BCx  - Cont IV Ertapenem 1gm q24h x 7 days since BCx clearance, EOT 12/6/24  - DC Abx regimen will be IV Ertapenem 1 gm q24h  - Duration of antibiotics is 1 weeks ( 11/30 - 12/6 )  - Weekly labs: CMP, CBC w diff faxed to ID office at [phone]  - Patient to follow up with Dr. Alexis in 1 week, ID office will call patient to schedule appt  We will sign off at this time, please call us back as needed.
79M w pmhx AFib, esophageal cancer (s/p partial esophagectomy), ulcerative colitis, DM, peripheral neuropathy, HTN, HLD, asthma, depression/anxiety admitted w somnolence/lethargy, found to have imaging concerning for LLL lung consolidation, felt to have metabolic encephalopathy 2/2 PNA vs infected sacral ulcer.  CT A/P showed air in duodenum - possible contained perf, exam benign, surgery felt this could be ar in diverticulum.   11/28 BCx grew ESBL-producing Proteus mirabilis (S- Ertapenem).    11/29 Surveillance BCx ESBL P mirabilis,   11/30 BCx NGTD, 48 hr  12/1 BCx specimen recvd  Last documented fever on 11/29, afebrile, HDS x 48 hr.  - F/u 11/30 and 12/1 BCx  - Cont IV Ertapenem 1gm q24h  ID Team 1 will follow.
Patient seen and examined at bedside. No acute events overnight. Seems more interactive this morning. Likely his encephalopathy is infectious from his proteus bacteremia. He has surveillance cultures pending. Question the source of this ESBL proteus. Per surgery unlikely the sacral decubitus ulcer is the source and he does have signs of a pneumonia. UA without pyuria to indicate UTI. Likely will treat as bacteremia due to PNA? Wound care following for decubitus ulcer. Added basal insulin (home dose) for hyperglycemia -- monitor.    Likely DC back to [facility] when ready.
79-year-old male with a PMHx of Afib (on Xarelto), HTN, esophageal neoplasm (s/p partial esophagectomy), Crohns disease, DMII and recent femur fracture (10/24) c/b sacral ulcer who presented with metabolic encephalopathy, found to have severe sepsis 2/2 gram-negative pneumonia c/b ESBL Proteus bacteremia. BCx (11/28 and 11/29): ESBL Proteus. BCx (11/30 and 12/1): NGTD. TTE without evidence of vegetations.       Plan:     -ID consulted, continue with Ertapenem, likely 2-week course but will follow up final recommendations today   -ACS consulted, no plan for surgical intervention, sacral ulcer is unlikely source of sepsis  -wound care consulted, appreciate recommendations    -PICC team consult for midline placement today   -PT recommends KOLBY    -SW consult     Rest of plan as per resident note.
79-year-old male with a PMHx of Afib (on Xarelto), HTN, esophageal neoplasm (s/p partial esophagectomy), Crohns disease, DMII and recent femur fracture (10/24) c/b sacral ulcer who presented with metabolic encephalopathy, found to have severe sepsis 2/2 gram-negative pneumonia > infected sacral ulcer c/b ESBL Proteus bacteremia. BCx (11/28 and 11/29): ESBL Proteus. BCx (11/30): NGTD. BCx (12/1): pending. TTE without evidence of vegetations.      Plan:    -ID consulted, continue with Ertapenem, likely 2-week course   -ACS consulted, no plan for surgical intervention, unlikely source of sepsis    -wound care consulted, appreciate recommendations   -PICC team consult for midline once final ID recommendations are obtained   -PT recommends KOLBY   -SW consult      Rest of plan as per resident note.
Ptn orientation difficult to assess on exam today, significant metabolic encephalopathy iso infx, arousable to verbal stimuli but not answering questions, will monitor for improvement on abx.  Speech and swallow to assess for diet recommendations, cw maintenance IVF for now given ptn dry on exam.  Cw vanc and zosyn broadly given sacral ulcer and GNR bacteremia on bcx pending speciation, send surveillance cultures, wound care following.  Rates well controlled on metop.

## 2024-12-03 NOTE — PROGRESS NOTE ADULT - PROBLEM SELECTOR PLAN 2
Lethargic but arousable. Protecting airway. Baseline AOx4. Likely i/s/o severe sepsis 2/2 PNA vs sacral ulcer vs other.    Plan:  -Management as above  -Maintain diurnal cycle
Lethargic but arousable. Protecting airway. Baseline AOx4. Likely i/s/o severe sepsis 2/2 PNA vs sacral ulcer vs other.    Plan:  -Management as above
RESOLVED  Lethargic but arousable. Protecting airway. Baseline deafness, dementia. Likely i/s/o severe sepsis 2/2 PNA vs sacral ulcer vs other. now he is awake and alert and at baseline    Plan:  -Management as above  -Maintain diurnal cycle
Lethargic but arousable. Protecting airway. Baseline AOx4. Likely i/s/o severe sepsis 2/2 PNA vs sacral ulcer vs other.    Plan:  -Management as above  -Maintain diurnal cycle
Lethargic but arousable. Protecting airway. Baseline AOx4. Likely i/s/o severe sepsis 2/2 PNA vs sacral ulcer vs other.    Plan:  -Management as above  -Maintain diurnal cycle

## 2024-12-03 NOTE — PROGRESS NOTE ADULT - SUBJECTIVE AND OBJECTIVE BOX
Patient is a 79y old  Male who presents with a chief complaint of Severe sepsis (02 Dec 2024 09:37)    OVERNIGHT EVENTS: [place]    SUBJECTIVE:     ROS: otherwise negative      T(C): 37.6 (12-02-24 @ 21:05), Max: 37.6 (12-02-24 @ 21:05)  HR: 85 (12-02-24 @ 21:05) (85 - 92)  BP: 119/75 (12-02-24 @ 21:05) (115/69 - 119/76)  RR: 18 (12-02-24 @ 21:05) (17 - 18)  SpO2: 98% (12-02-24 @ 21:05) (98% - 98%)  Wt(kg): --Vital Signs Last 24 Hrs  T(C): 37.6 (02 Dec 2024 21:05), Max: 37.6 (02 Dec 2024 21:05)  T(F): 99.7 (02 Dec 2024 21:05), Max: 99.7 (02 Dec 2024 21:05)  HR: 85 (02 Dec 2024 21:05) (85 - 92)  BP: 119/75 (02 Dec 2024 21:05) (115/69 - 119/76)  BP(mean): --  RR: 18 (02 Dec 2024 21:05) (17 - 18)  SpO2: 98% (02 Dec 2024 21:05) (98% - 98%)    Parameters below as of 02 Dec 2024 21:05  Patient On (Oxygen Delivery Method): room air        PHYSICAL EXAM:  Constitutional: resting comfortably in bed; NAD  Head: NC/AT  Eyes: PERRL, EOMI, anicteric sclera  ENT: no nasal discharge; MMM  Neck: supple; no JVD or thyromegaly  Respiratory: CTA B/L; no W/R/R, no retractions  Cardiac: +S1/S2; RRR; no M/R/G  Gastrointestinal: soft, NT/ND; no rebound or guarding; +BSx4  Back: spine midline, no bony tenderness or step-offs; no CVAT B/L  Extremities: WWP, no clubbing or cyanosis; no peripheral edema. Capillary refill <2 sec  Musculoskeletal: NROM x4; no joint swelling, tenderness or erythema  Vascular: 2+ radial, DP/PT pulses B/L  Dermatologic: skin warm, dry and intact; no rashes, wounds, or scars  Lymphatic: no submandibular or cervical LAD  Neurologic: AAOx3; CNII-XII grossly intact; no focal deficits  Psychiatric: affect and characteristics of appearance, verbalizations, behaviors are appropriate    LABS:                        8.8    5.16  )-----------( 281      ( 02 Dec 2024 05:30 )             28.2     12-02    145  |  110[H]  |  19  ----------------------------<  181[H]  4.2   |  27  |  0.56    Ca    8.4      02 Dec 2024 05:30  Phos  2.5     12-02  Mg     1.6     12-02          Urinalysis Basic - ( 02 Dec 2024 05:30 )    Color: x / Appearance: x / SG: x / pH: x  Gluc: 181 mg/dL / Ketone: x  / Bili: x / Urobili: x   Blood: x / Protein: x / Nitrite: x   Leuk Esterase: x / RBC: x / WBC x   Sq Epi: x / Non Sq Epi: x / Bacteria: x      CAPILLARY BLOOD GLUCOSE      POCT Blood Glucose.: 211 mg/dL (02 Dec 2024 22:21)  POCT Blood Glucose.: 143 mg/dL (02 Dec 2024 17:40)  POCT Blood Glucose.: 233 mg/dL (02 Dec 2024 13:08)  POCT Blood Glucose.: 218 mg/dL (02 Dec 2024 09:13)        Urinalysis Basic - ( 02 Dec 2024 05:30 )    Color: x / Appearance: x / SG: x / pH: x  Gluc: 181 mg/dL / Ketone: x  / Bili: x / Urobili: x   Blood: x / Protein: x / Nitrite: x   Leuk Esterase: x / RBC: x / WBC x   Sq Epi: x / Non Sq Epi: x / Bacteria: x        MEDICATIONS  (STANDING):  ascorbic acid 500 milliGRAM(s) Oral two times a day  cholecalciferol 1000 Unit(s) Oral daily  collagenase Ointment 1 Application(s) Topical daily  cyanocobalamin 1000 MICROGram(s) Oral daily  dextrose 5%. 1000 milliLiter(s) (100 mL/Hr) IV Continuous <Continuous>  dextrose 5%. 1000 milliLiter(s) (50 mL/Hr) IV Continuous <Continuous>  dextrose 50% Injectable 25 Gram(s) IV Push once  dextrose 50% Injectable 12.5 Gram(s) IV Push once  dextrose 50% Injectable 25 Gram(s) IV Push once  ertapenem  IVPB      ertapenem  IVPB 1000 milliGRAM(s) IV Intermittent every 24 hours  glucagon  Injectable 1 milliGRAM(s) IntraMuscular once  insulin glargine Injectable (LANTUS) 8 Unit(s) SubCutaneous at bedtime  insulin lispro (ADMELOG) corrective regimen sliding scale   SubCutaneous three times a day before meals  metoprolol succinate ER 25 milliGRAM(s) Oral every 24 hours  multivitamin 1 Tablet(s) Oral daily  pantoprazole    Tablet 40 milliGRAM(s) Oral before breakfast  rivaroxaban 20 milliGRAM(s) Oral with dinner  thiamine 100 milliGRAM(s) Oral daily  zinc sulfate 220 milliGRAM(s) Oral daily    MEDICATIONS  (PRN):  acetaminophen     Tablet .. 650 milliGRAM(s) Oral every 6 hours PRN Temp greater or equal to 38C (100.4F), Mild Pain (1 - 3)  aluminum hydroxide/magnesium hydroxide/simethicone Suspension 30 milliLiter(s) Oral every 4 hours PRN Dyspepsia  dextrose Oral Gel 15 Gram(s) Oral once PRN Blood Glucose LESS THAN 70 milliGRAM(s)/deciliter  melatonin 3 milliGRAM(s) Oral at bedtime PRN Insomnia  ondansetron Injectable 4 milliGRAM(s) IV Push every 8 hours PRN Nausea and/or Vomiting      RADIOLOGY & ADDITIONAL TESTS: Reviewed   No Patient is a 79y old  Male who presents with a chief complaint of Severe sepsis (02 Dec 2024 09:37)    OVERNIGHT EVENTS: [place]    SUBJECTIVE: seen at bedside this AM, was sleeping and initially difficult to arouse, but upon further verbal and physical stimulation, he awoke and tracked me with eyes and squeezed my hands.     ROS: otherwise negative      T(C): 37.6 (12-02-24 @ 21:05), Max: 37.6 (12-02-24 @ 21:05)  HR: 85 (12-02-24 @ 21:05) (85 - 92)  BP: 119/75 (12-02-24 @ 21:05) (115/69 - 119/76)  RR: 18 (12-02-24 @ 21:05) (17 - 18)  SpO2: 98% (12-02-24 @ 21:05) (98% - 98%)  Wt(kg): --Vital Signs Last 24 Hrs  T(C): 37.6 (02 Dec 2024 21:05), Max: 37.6 (02 Dec 2024 21:05)  T(F): 99.7 (02 Dec 2024 21:05), Max: 99.7 (02 Dec 2024 21:05)  HR: 85 (02 Dec 2024 21:05) (85 - 92)  BP: 119/75 (02 Dec 2024 21:05) (115/69 - 119/76)  BP(mean): --  RR: 18 (02 Dec 2024 21:05) (17 - 18)  SpO2: 98% (02 Dec 2024 21:05) (98% - 98%)    Parameters below as of 02 Dec 2024 21:05  Patient On (Oxygen Delivery Method): room air        PHYSICAL EXAM:  Constitutional: resting in bed; NAD  Head: NC/AT  Eyes: PERRL, EOMI, anicteric sclera  ENT: no nasal discharge; MMM  Neck: supple; no JVD or thyromegaly  Respiratory: CTA B/L; no W/R/R, no retractions  Cardiac: 3/6 systolic murmur loudest over RSB but heard throughout precordium  Gastrointestinal: soft, NT/ND; no rebound or guarding; +BSx4  Extremities: WWP, no clubbing or cyanosis; no peripheral edema. Capillary refill <2 sec  Musculoskeletal: NROM x4; no joint swelling, tenderness or erythema  Vascular: 2+ radial, DP/PT pulses B/L  Dermatologic: skin warm, dry and intact; no rashes, wounds, or scars  Neurologic: awake and alert, but difficult to assess orientation given dementia and deafness, but squeezes hands and wiggles toes and tracks with eyes, no focal neurological deficits    LABS:                        8.8    5.16  )-----------( 281      ( 02 Dec 2024 05:30 )             28.2     12-02    145  |  110[H]  |  19  ----------------------------<  181[H]  4.2   |  27  |  0.56    Ca    8.4      02 Dec 2024 05:30  Phos  2.5     12-02  Mg     1.6     12-02          Urinalysis Basic - ( 02 Dec 2024 05:30 )    Color: x / Appearance: x / SG: x / pH: x  Gluc: 181 mg/dL / Ketone: x  / Bili: x / Urobili: x   Blood: x / Protein: x / Nitrite: x   Leuk Esterase: x / RBC: x / WBC x   Sq Epi: x / Non Sq Epi: x / Bacteria: x      CAPILLARY BLOOD GLUCOSE      POCT Blood Glucose.: 211 mg/dL (02 Dec 2024 22:21)  POCT Blood Glucose.: 143 mg/dL (02 Dec 2024 17:40)  POCT Blood Glucose.: 233 mg/dL (02 Dec 2024 13:08)  POCT Blood Glucose.: 218 mg/dL (02 Dec 2024 09:13)        Urinalysis Basic - ( 02 Dec 2024 05:30 )    Color: x / Appearance: x / SG: x / pH: x  Gluc: 181 mg/dL / Ketone: x  / Bili: x / Urobili: x   Blood: x / Protein: x / Nitrite: x   Leuk Esterase: x / RBC: x / WBC x   Sq Epi: x / Non Sq Epi: x / Bacteria: x        MEDICATIONS  (STANDING):  ascorbic acid 500 milliGRAM(s) Oral two times a day  cholecalciferol 1000 Unit(s) Oral daily  collagenase Ointment 1 Application(s) Topical daily  cyanocobalamin 1000 MICROGram(s) Oral daily  dextrose 5%. 1000 milliLiter(s) (100 mL/Hr) IV Continuous <Continuous>  dextrose 5%. 1000 milliLiter(s) (50 mL/Hr) IV Continuous <Continuous>  dextrose 50% Injectable 25 Gram(s) IV Push once  dextrose 50% Injectable 12.5 Gram(s) IV Push once  dextrose 50% Injectable 25 Gram(s) IV Push once  ertapenem  IVPB      ertapenem  IVPB 1000 milliGRAM(s) IV Intermittent every 24 hours  glucagon  Injectable 1 milliGRAM(s) IntraMuscular once  insulin glargine Injectable (LANTUS) 8 Unit(s) SubCutaneous at bedtime  insulin lispro (ADMELOG) corrective regimen sliding scale   SubCutaneous three times a day before meals  metoprolol succinate ER 25 milliGRAM(s) Oral every 24 hours  multivitamin 1 Tablet(s) Oral daily  pantoprazole    Tablet 40 milliGRAM(s) Oral before breakfast  rivaroxaban 20 milliGRAM(s) Oral with dinner  thiamine 100 milliGRAM(s) Oral daily  zinc sulfate 220 milliGRAM(s) Oral daily    MEDICATIONS  (PRN):  acetaminophen     Tablet .. 650 milliGRAM(s) Oral every 6 hours PRN Temp greater or equal to 38C (100.4F), Mild Pain (1 - 3)  aluminum hydroxide/magnesium hydroxide/simethicone Suspension 30 milliLiter(s) Oral every 4 hours PRN Dyspepsia  dextrose Oral Gel 15 Gram(s) Oral once PRN Blood Glucose LESS THAN 70 milliGRAM(s)/deciliter  melatonin 3 milliGRAM(s) Oral at bedtime PRN Insomnia  ondansetron Injectable 4 milliGRAM(s) IV Push every 8 hours PRN Nausea and/or Vomiting      RADIOLOGY & ADDITIONAL TESTS: Reviewed

## 2024-12-03 NOTE — PROGRESS NOTE ADULT - ASSESSMENT
Pt is a 80YO M with PMHx of Afib on Xarelto, HTN, Esophageal Cancer (in remission since early ), ?CVA,  Crohns, Chronic colitis, T2DM, recent femur fracture in Oct 2024, who was sent in from [facility] rehab (were he resided since Hip surgery in October) for lethargy/AMS, hypotension and tachycardic to 160s at [facility]. Per chart review, prior to the fall, pt lived alone and walked with walker. While at rehab he has been bed-bound and developed a sacral ulcer about 3 weeks ago and was started on oral abx Keflex on , unclear indication. In the ED pt noted for have T 100.4 and received Zosyn 3.375, vancomycin 1g, Bcx growing Proteus/ESBL/CTX-M resistance marker ID consulted for Abx recommendations, now on Ertapenem.      Micro/Lab findings:    UA neg   : RVP neg  : Blood cx x2 growing Proteus, ESBL/CTX-M resistance marker     Urine Strep/Legionella neg    StaphA PCR+, MRSA neg    Blood cx x2 growing Proteus Mirabilis ESBL  : Bcx NGTD 48hr  : Bcx NGTD 24h     Imagin/28 CXR: small patchy opacities the largest of which is at the left lung base    CTH No acute pathology    CTAP: Left lower lobe pneumonia. In the medial aspect of the second portion of the duodenum, there are  few air pockets, which may represent air within the duodenal diverticulum or contained perforation. Skin thickening and subcutaneous fat stranding/small collection in the sacrum, consistent with sacral decubitus ulcer. No CT evidence of   osteomyelitis.   TTE no vegetations     #Severe Sepsis   #PNA  #Sacral ulcer  Pt presented with Severe sepsis s/p Vanc Zosyn in ED, then narrowed to CTX now Bcx growing Proteus/ESBL/CTX-M resistance marker. Pt afebrile, HDS, WBC wnl. Recommended starting Ertapenem and f/u culture specification and sensitivies. If cx grows PsA, will need to adjust Abx. Repeat Blood cx x2 on  +Proteus mirabilis. Given CT findings with possible collections would favor 14 day course of IV Abx with follow up repeat imaging.    Pt is a 78YO M with PMHx of Afib on Xarelto, HTN, Esophageal Cancer (in remission since early ), ?CVA,  Crohns, Chronic colitis, T2DM, recent femur fracture in Oct 2024, who was sent in from [facility] rehab (were he resided since Hip surgery in October) for lethargy/AMS, hypotension and tachycardic to 160s at [facility]. Per chart review, prior to the fall, pt lived alone and walked with walker. While at rehab he has been bed-bound and developed a sacral ulcer about 3 weeks ago and was started on oral abx Keflex on , unclear indication. In the ED pt noted for have T 100.4 and received Zosyn 3.375, vancomycin 1g, Bcx growing Proteus/ESBL/CTX-M resistance marker ID consulted for Abx recommendations, now on Ertapenem.      Micro/Lab findings:    UA neg   : RVP neg  : Blood cx x2 growing Proteus, ESBL/CTX-M resistance marker     Urine Strep/Legionella neg    StaphA PCR+, MRSA neg    Blood cx x2 growing Proteus Mirabilis ESBL  : Bcx NGTD 48hr  : Bcx NGTD 24h     Imagin/28 CXR: small patchy opacities the largest of which is at the left lung base    CTH No acute pathology    CTAP: Left lower lobe pneumonia. In the medial aspect of the second portion of the duodenum, there are  few air pockets, which may represent air within the duodenal diverticulum or contained perforation. Skin thickening and subcutaneous fat stranding/small collection in the sacrum, consistent with sacral decubitus ulcer. No CT evidence of   osteomyelitis.   TTE no vegetations     #Severe Sepsis   #PNA  #Sacral ulcer  Pt presented with Severe sepsis s/p Vanc Zosyn in ED, then narrowed to CTX now Bcx growing Proteus/ESBL/CTX-M resistance marker. Pt afebrile, HDS, WBC wnl. Recommended starting Ertapenem and f/u culture specification and sensitivies. If cx grows PsA, will need to adjust Abx. Repeat Blood cx x2 on  +Proteus mirabilis. CT findings with possible contained perf, however per surgery no concern for contained colonic perforation. Therefore can continue treatment with ertapenem 1g q24 for a total of 7 days.  Bcx NGTD 48h,  Bcx NGTD 24h. Ok to place midline to complete a 7 day course (-) of Ertapenem 1g q24h. No need for repeat imaging at this time.      - ok to place midline  - Discharge patient with Ertapenem 1g q24h   - Duration of antibiotics is 7 days ( - )  - Please set up home infusion with [facility]  - Weekly labs: CMP, CBC w/ diff, ESR, CRP faxed to ID office  - Patient to follow up with Dr. Alexis      ID Team 1 will sign off.

## 2024-12-04 ENCOUNTER — TRANSCRIPTION ENCOUNTER (OUTPATIENT)
Age: 79
End: 2024-12-04

## 2024-12-04 VITALS
RESPIRATION RATE: 18 BRPM | SYSTOLIC BLOOD PRESSURE: 133 MMHG | DIASTOLIC BLOOD PRESSURE: 74 MMHG | TEMPERATURE: 98 F | OXYGEN SATURATION: 99 % | HEART RATE: 75 BPM

## 2024-12-04 LAB
ANION GAP SERPL CALC-SCNC: 12 MMOL/L — SIGNIFICANT CHANGE UP (ref 5–17)
BASOPHILS # BLD AUTO: 0.03 K/UL — SIGNIFICANT CHANGE UP (ref 0–0.2)
BASOPHILS NFR BLD AUTO: 0.5 % — SIGNIFICANT CHANGE UP (ref 0–2)
BUN SERPL-MCNC: 21 MG/DL — SIGNIFICANT CHANGE UP (ref 7–23)
CALCIUM SERPL-MCNC: 8.1 MG/DL — LOW (ref 8.4–10.5)
CHLORIDE SERPL-SCNC: 103 MMOL/L — SIGNIFICANT CHANGE UP (ref 96–108)
CO2 SERPL-SCNC: 25 MMOL/L — SIGNIFICANT CHANGE UP (ref 22–31)
CREAT SERPL-MCNC: 0.61 MG/DL — SIGNIFICANT CHANGE UP (ref 0.5–1.3)
EGFR: 98 ML/MIN/1.73M2 — SIGNIFICANT CHANGE UP
EOSINOPHIL # BLD AUTO: 0.08 K/UL — SIGNIFICANT CHANGE UP (ref 0–0.5)
EOSINOPHIL NFR BLD AUTO: 1.2 % — SIGNIFICANT CHANGE UP (ref 0–6)
GLUCOSE SERPL-MCNC: 105 MG/DL — HIGH (ref 70–99)
HCT VFR BLD CALC: 28.6 % — LOW (ref 39–50)
HGB BLD-MCNC: 8.5 G/DL — LOW (ref 13–17)
IMM GRANULOCYTES NFR BLD AUTO: 0.5 % — SIGNIFICANT CHANGE UP (ref 0–0.9)
LYMPHOCYTES # BLD AUTO: 0.93 K/UL — LOW (ref 1–3.3)
LYMPHOCYTES # BLD AUTO: 14.2 % — SIGNIFICANT CHANGE UP (ref 13–44)
MAGNESIUM SERPL-MCNC: 1.8 MG/DL — SIGNIFICANT CHANGE UP (ref 1.6–2.6)
MCHC RBC-ENTMCNC: 27.1 PG — SIGNIFICANT CHANGE UP (ref 27–34)
MCHC RBC-ENTMCNC: 29.7 G/DL — LOW (ref 32–36)
MCV RBC AUTO: 91.1 FL — SIGNIFICANT CHANGE UP (ref 80–100)
MONOCYTES # BLD AUTO: 0.31 K/UL — SIGNIFICANT CHANGE UP (ref 0–0.9)
MONOCYTES NFR BLD AUTO: 4.7 % — SIGNIFICANT CHANGE UP (ref 2–14)
NEUTROPHILS # BLD AUTO: 5.18 K/UL — SIGNIFICANT CHANGE UP (ref 1.8–7.4)
NEUTROPHILS NFR BLD AUTO: 78.9 % — HIGH (ref 43–77)
NRBC # BLD: 0 /100 WBCS — SIGNIFICANT CHANGE UP (ref 0–0)
PHOSPHATE SERPL-MCNC: 2.5 MG/DL — SIGNIFICANT CHANGE UP (ref 2.5–4.5)
PLATELET # BLD AUTO: 370 K/UL — SIGNIFICANT CHANGE UP (ref 150–400)
POTASSIUM SERPL-MCNC: 3 MMOL/L — LOW (ref 3.5–5.3)
POTASSIUM SERPL-SCNC: 3 MMOL/L — LOW (ref 3.5–5.3)
RBC # BLD: 3.14 M/UL — LOW (ref 4.2–5.8)
RBC # FLD: 14.5 % — SIGNIFICANT CHANGE UP (ref 10.3–14.5)
SODIUM SERPL-SCNC: 140 MMOL/L — SIGNIFICANT CHANGE UP (ref 135–145)
WBC # BLD: 6.56 K/UL — SIGNIFICANT CHANGE UP (ref 3.8–10.5)
WBC # FLD AUTO: 6.56 K/UL — SIGNIFICANT CHANGE UP (ref 3.8–10.5)

## 2024-12-04 PROCEDURE — 85610 PROTHROMBIN TIME: CPT

## 2024-12-04 PROCEDURE — 96368 THER/DIAG CONCURRENT INF: CPT

## 2024-12-04 PROCEDURE — 83550 IRON BINDING TEST: CPT

## 2024-12-04 PROCEDURE — 85652 RBC SED RATE AUTOMATED: CPT

## 2024-12-04 PROCEDURE — 87641 MR-STAPH DNA AMP PROBE: CPT

## 2024-12-04 PROCEDURE — 83036 HEMOGLOBIN GLYCOSYLATED A1C: CPT

## 2024-12-04 PROCEDURE — 86140 C-REACTIVE PROTEIN: CPT

## 2024-12-04 PROCEDURE — 83540 ASSAY OF IRON: CPT

## 2024-12-04 PROCEDURE — 81001 URINALYSIS AUTO W/SCOPE: CPT

## 2024-12-04 PROCEDURE — 82803 BLOOD GASES ANY COMBINATION: CPT

## 2024-12-04 PROCEDURE — 84145 PROCALCITONIN (PCT): CPT

## 2024-12-04 PROCEDURE — 97530 THERAPEUTIC ACTIVITIES: CPT

## 2024-12-04 PROCEDURE — 0225U NFCT DS DNA&RNA 21 SARSCOV2: CPT

## 2024-12-04 PROCEDURE — 87637 SARSCOV2&INF A&B&RSV AMP PRB: CPT

## 2024-12-04 PROCEDURE — 93306 TTE W/DOPPLER COMPLETE: CPT

## 2024-12-04 PROCEDURE — 87449 NOS EACH ORGANISM AG IA: CPT

## 2024-12-04 PROCEDURE — 82607 VITAMIN B-12: CPT

## 2024-12-04 PROCEDURE — 93005 ELECTROCARDIOGRAM TRACING: CPT

## 2024-12-04 PROCEDURE — 80053 COMPREHEN METABOLIC PANEL: CPT

## 2024-12-04 PROCEDURE — 87186 SC STD MICRODIL/AGAR DIL: CPT

## 2024-12-04 PROCEDURE — 84132 ASSAY OF SERUM POTASSIUM: CPT

## 2024-12-04 PROCEDURE — 80048 BASIC METABOLIC PNL TOTAL CA: CPT

## 2024-12-04 PROCEDURE — 82330 ASSAY OF CALCIUM: CPT

## 2024-12-04 PROCEDURE — 84100 ASSAY OF PHOSPHORUS: CPT

## 2024-12-04 PROCEDURE — 85025 COMPLETE CBC W/AUTO DIFF WBC: CPT

## 2024-12-04 PROCEDURE — 92610 EVALUATE SWALLOWING FUNCTION: CPT

## 2024-12-04 PROCEDURE — 85045 AUTOMATED RETICULOCYTE COUNT: CPT

## 2024-12-04 PROCEDURE — 82746 ASSAY OF FOLIC ACID SERUM: CPT

## 2024-12-04 PROCEDURE — 83735 ASSAY OF MAGNESIUM: CPT

## 2024-12-04 PROCEDURE — 84484 ASSAY OF TROPONIN QUANT: CPT

## 2024-12-04 PROCEDURE — 97110 THERAPEUTIC EXERCISES: CPT

## 2024-12-04 PROCEDURE — 87899 AGENT NOS ASSAY W/OPTIC: CPT

## 2024-12-04 PROCEDURE — 97161 PT EVAL LOW COMPLEX 20 MIN: CPT

## 2024-12-04 PROCEDURE — 85730 THROMBOPLASTIN TIME PARTIAL: CPT

## 2024-12-04 PROCEDURE — 82728 ASSAY OF FERRITIN: CPT

## 2024-12-04 PROCEDURE — 92526 ORAL FUNCTION THERAPY: CPT

## 2024-12-04 PROCEDURE — 71045 X-RAY EXAM CHEST 1 VIEW: CPT

## 2024-12-04 PROCEDURE — 99239 HOSP IP/OBS DSCHRG MGMT >30: CPT

## 2024-12-04 PROCEDURE — 87077 CULTURE AEROBIC IDENTIFY: CPT

## 2024-12-04 PROCEDURE — 87640 STAPH A DNA AMP PROBE: CPT

## 2024-12-04 PROCEDURE — 74177 CT ABD & PELVIS W/CONTRAST: CPT | Mod: MC

## 2024-12-04 PROCEDURE — 85027 COMPLETE CBC AUTOMATED: CPT

## 2024-12-04 PROCEDURE — 36415 COLL VENOUS BLD VENIPUNCTURE: CPT

## 2024-12-04 PROCEDURE — 97165 OT EVAL LOW COMPLEX 30 MIN: CPT

## 2024-12-04 PROCEDURE — 96365 THER/PROPH/DIAG IV INF INIT: CPT

## 2024-12-04 PROCEDURE — 83605 ASSAY OF LACTIC ACID: CPT

## 2024-12-04 PROCEDURE — 99285 EMERGENCY DEPT VISIT HI MDM: CPT

## 2024-12-04 PROCEDURE — 82962 GLUCOSE BLOOD TEST: CPT

## 2024-12-04 PROCEDURE — 84295 ASSAY OF SERUM SODIUM: CPT

## 2024-12-04 PROCEDURE — 70450 CT HEAD/BRAIN W/O DYE: CPT | Mod: MC

## 2024-12-04 PROCEDURE — 87040 BLOOD CULTURE FOR BACTERIA: CPT

## 2024-12-04 PROCEDURE — 87150 DNA/RNA AMPLIFIED PROBE: CPT

## 2024-12-04 RX ORDER — POTASSIUM CHLORIDE 600 MG/1
40 TABLET, EXTENDED RELEASE ORAL ONCE
Refills: 0 | Status: COMPLETED | OUTPATIENT
Start: 2024-12-04 | End: 2024-12-04

## 2024-12-04 RX ADMIN — COLLAGENASE SANTYL 1 APPLICATION(S): 250 OINTMENT TOPICAL at 11:27

## 2024-12-04 RX ADMIN — Medication 1000 MICROGRAM(S): at 11:25

## 2024-12-04 RX ADMIN — PANTOPRAZOLE SODIUM 40 MILLIGRAM(S): 40 TABLET, DELAYED RELEASE ORAL at 06:23

## 2024-12-04 RX ADMIN — Medication 500 MILLIGRAM(S): at 06:23

## 2024-12-04 RX ADMIN — POTASSIUM CHLORIDE 40 MILLIEQUIVALENT(S): 600 TABLET, EXTENDED RELEASE ORAL at 11:24

## 2024-12-04 RX ADMIN — Medication 100 MILLIGRAM(S): at 11:24

## 2024-12-04 RX ADMIN — Medication 220 MILLIGRAM(S): at 11:25

## 2024-12-04 RX ADMIN — Medication 1000 UNIT(S): at 11:24

## 2024-12-04 RX ADMIN — Medication 1 TABLET(S): at 11:24

## 2024-12-04 NOTE — DISCHARGE NOTE NURSING/CASE MANAGEMENT/SOCIAL WORK - PATIENT PORTAL LINK FT
You can access the FollowMyHealth Patient Portal offered by [facility] by registering at the following website: [email or web address]. By joining [employer]’s FollowMyHealth portal, you will also be able to view your health information using other applications (apps) compatible with our system.

## 2024-12-04 NOTE — DISCHARGE NOTE NURSING/CASE MANAGEMENT/SOCIAL WORK - FINANCIAL ASSISTANCE
[facility] provides services at a reduced cost to those who are determined to be eligible through [facility]’s financial assistance program. Information regarding [facility]’s financial assistance program can be found by going to [email or web address] or by calling [phone].

## 2024-12-05 LAB
CULTURE RESULTS: SIGNIFICANT CHANGE UP
SPECIMEN SOURCE: SIGNIFICANT CHANGE UP

## 2024-12-07 LAB
CULTURE RESULTS: SIGNIFICANT CHANGE UP
SPECIMEN SOURCE: SIGNIFICANT CHANGE UP

## 2024-12-16 DIAGNOSIS — Z79.01 LONG TERM (CURRENT) USE OF ANTICOAGULANTS: ICD-10-CM

## 2024-12-16 DIAGNOSIS — J15.69 PNEUMONIA DUE TO OTHER GRAM-NEGATIVE BACTERIA: ICD-10-CM

## 2024-12-16 DIAGNOSIS — E87.20 ACIDOSIS, UNSPECIFIED: ICD-10-CM

## 2024-12-16 DIAGNOSIS — F32.89 OTHER SPECIFIED DEPRESSIVE EPISODES: ICD-10-CM

## 2024-12-16 DIAGNOSIS — I10 ESSENTIAL (PRIMARY) HYPERTENSION: ICD-10-CM

## 2024-12-16 DIAGNOSIS — E11.40 TYPE 2 DIABETES MELLITUS WITH DIABETIC NEUROPATHY, UNSPECIFIED: ICD-10-CM

## 2024-12-16 DIAGNOSIS — A41.59 OTHER GRAM-NEGATIVE SEPSIS: ICD-10-CM

## 2024-12-16 DIAGNOSIS — K44.9 DIAPHRAGMATIC HERNIA WITHOUT OBSTRUCTION OR GANGRENE: ICD-10-CM

## 2024-12-16 DIAGNOSIS — R65.20 SEVERE SEPSIS WITHOUT SEPTIC SHOCK: ICD-10-CM

## 2024-12-16 DIAGNOSIS — E43 UNSPECIFIED SEVERE PROTEIN-CALORIE MALNUTRITION: ICD-10-CM

## 2024-12-16 DIAGNOSIS — K57.10 DIVERTICULOSIS OF SMALL INTESTINE WITHOUT PERFORATION OR ABSCESS WITHOUT BLEEDING: ICD-10-CM

## 2024-12-16 DIAGNOSIS — G93.41 METABOLIC ENCEPHALOPATHY: ICD-10-CM

## 2024-12-16 DIAGNOSIS — A41.9 SEPSIS, UNSPECIFIED ORGANISM: ICD-10-CM

## 2024-12-16 DIAGNOSIS — Z79.84 LONG TERM (CURRENT) USE OF ORAL HYPOGLYCEMIC DRUGS: ICD-10-CM

## 2024-12-16 DIAGNOSIS — Z91.040 LATEX ALLERGY STATUS: ICD-10-CM

## 2024-12-16 DIAGNOSIS — L89.159 PRESSURE ULCER OF SACRAL REGION, UNSPECIFIED STAGE: ICD-10-CM

## 2024-12-16 DIAGNOSIS — D64.9 ANEMIA, UNSPECIFIED: ICD-10-CM

## 2024-12-16 DIAGNOSIS — K86.9 DISEASE OF PANCREAS, UNSPECIFIED: ICD-10-CM

## 2024-12-16 DIAGNOSIS — Z16.12 EXTENDED SPECTRUM BETA LACTAMASE (ESBL) RESISTANCE: ICD-10-CM

## 2024-12-16 DIAGNOSIS — N17.0 ACUTE KIDNEY FAILURE WITH TUBULAR NECROSIS: ICD-10-CM

## 2024-12-16 DIAGNOSIS — I48.92 UNSPECIFIED ATRIAL FLUTTER: ICD-10-CM

## 2024-12-16 DIAGNOSIS — Z79.4 LONG TERM (CURRENT) USE OF INSULIN: ICD-10-CM
